# Patient Record
Sex: FEMALE | URBAN - METROPOLITAN AREA
[De-identification: names, ages, dates, MRNs, and addresses within clinical notes are randomized per-mention and may not be internally consistent; named-entity substitution may affect disease eponyms.]

---

## 2017-02-06 ENCOUNTER — APPOINTMENT (RX ONLY)
Dept: URBAN - METROPOLITAN AREA CLINIC 158 | Facility: CLINIC | Age: 72
Setting detail: DERMATOLOGY
End: 2017-02-06

## 2017-02-06 VITALS — HEART RATE: 86 BPM | SYSTOLIC BLOOD PRESSURE: 200 MMHG | DIASTOLIC BLOOD PRESSURE: 88 MMHG

## 2017-02-06 DIAGNOSIS — L82.1 OTHER SEBORRHEIC KERATOSIS: ICD-10-CM

## 2017-02-06 DIAGNOSIS — D485 NEOPLASM OF UNCERTAIN BEHAVIOR OF SKIN: ICD-10-CM

## 2017-02-06 DIAGNOSIS — D22 MELANOCYTIC NEVI: ICD-10-CM

## 2017-02-06 PROBLEM — D22.72 MELANOCYTIC NEVI OF LEFT LOWER LIMB, INCLUDING HIP: Status: ACTIVE | Noted: 2017-02-06

## 2017-02-06 PROBLEM — I10 ESSENTIAL (PRIMARY) HYPERTENSION: Status: ACTIVE | Noted: 2017-02-06

## 2017-02-06 PROBLEM — M12.9 ARTHROPATHY, UNSPECIFIED: Status: ACTIVE | Noted: 2017-02-06

## 2017-02-06 PROBLEM — D22.39 MELANOCYTIC NEVI OF OTHER PARTS OF FACE: Status: ACTIVE | Noted: 2017-02-06

## 2017-02-06 PROBLEM — L85.3 XEROSIS CUTIS: Status: ACTIVE | Noted: 2017-02-06

## 2017-02-06 PROBLEM — D48.5 NEOPLASM OF UNCERTAIN BEHAVIOR OF SKIN: Status: ACTIVE | Noted: 2017-02-06

## 2017-02-06 PROBLEM — D03.72 MELANOMA IN SITU OF LEFT LOWER LIMB, INCLUDING HIP: Status: ACTIVE | Noted: 2017-02-06

## 2017-02-06 PROBLEM — E13.9 OTHER SPECIFIED DIABETES MELLITUS WITHOUT COMPLICATIONS: Status: ACTIVE | Noted: 2017-02-06

## 2017-02-06 PROCEDURE — ? BIOPSY BY SHAVE METHOD

## 2017-02-06 PROCEDURE — ? COUNSELING

## 2017-02-06 PROCEDURE — 99213 OFFICE O/P EST LOW 20 MIN: CPT | Mod: 25

## 2017-02-06 PROCEDURE — ? BIOPSY BY SHAVE METHOD WITH FROZEN SECTION

## 2017-02-06 PROCEDURE — 11100: CPT

## 2017-02-06 PROCEDURE — 11101: CPT

## 2017-02-06 ASSESSMENT — LOCATION SIMPLE DESCRIPTION DERM
LOCATION SIMPLE: LEFT UPPER BACK
LOCATION SIMPLE: RIGHT FOREHEAD
LOCATION SIMPLE: LEFT PRETIBIAL REGION
LOCATION SIMPLE: LEFT HEEL

## 2017-02-06 ASSESSMENT — LOCATION DETAILED DESCRIPTION DERM
LOCATION DETAILED: LEFT PROXIMAL PRETIBIAL REGION
LOCATION DETAILED: RIGHT LATERAL FOREHEAD
LOCATION DETAILED: LEFT LATERAL UPPER BACK
LOCATION DETAILED: LEFT HEEL

## 2017-02-06 ASSESSMENT — LOCATION ZONE DERM
LOCATION ZONE: FACE
LOCATION ZONE: TRUNK
LOCATION ZONE: FEET
LOCATION ZONE: LEG

## 2017-02-06 NOTE — PROCEDURE: BIOPSY BY SHAVE METHOD WITH FROZEN SECTION
Biopsy Method: 15 blade
Size Of Lesion In Cm (Optional): 3.3
Anesthesia Volume In Cc: 0.8
Bill 41016 For Specimen Handling/Conveyance To Laboratory?: no
Detail Level: Detailed
Anesthesia Type: 1% lidocaine with 1:100,000 epinephrine and 408mcg clindamycin/ml and a 1:10 solution of 8.4% sodium bicarbonate
Body Location Override (Optional - Billing Will Still Be Based On Selected Body Map Location If Applicable): left plantar heel
Wound Care: Bacitracin
Billing Type: Third-Party Bill
X Size Of Lesion In Cm (Optional): 0
Number Of Blocks: 1
Consent: Written consent was obtained and risks were reviewed including but not limited to scarring, infection, bleeding, scabbing, incomplete removal, nerve damage and allergy to anesthesia.
Frozen Path Diagnosis: MIS
Post-Care Instructions: I reviewed with the patient in detail post-care instructions. Patient is to keep the biopsy site dry overnight, and then apply bacitracin twice daily until healed. Patient may apply hydrogen peroxide soaks to remove any crusting.
Biopsy Type: Frozen Section
Processing Note: The specimen was frozen in the cryostat, sectioned and stained.
Hemostasis: Drysol
Notification Instructions: Patient will be notified of biopsy results. However, patient instructed to call the office if not contacted within 2 weeks.

## 2017-02-06 NOTE — PROCEDURE: BIOPSY BY SHAVE METHOD
Hemostasis: Drysol
Type Of Destruction Used: Curettage
Curettage Text: The wound bed was treated with curettage after the biopsy was performed.
Bill For Surgical Tray: no
Dressing: bandage
Anesthesia Type: 1% lidocaine with epinephrine
Cryotherapy Text: The wound bed was treated with cryotherapy after the biopsy was performed.
X Size Of Lesion In Cm: 0
Silver Nitrate Text: The wound bed was treated with silver nitrate after the biopsy was performed.
Biopsy Method: Dermablade
Electrodesiccation Text: The wound bed was treated with electrodesiccation after the biopsy was performed.
Notification Instructions: Patient will be notified of biopsy results. However, patient instructed to call the office if not contacted within 2 weeks.
Billing Type: Third-Party Bill
Consent: Written consent was obtained and risks were reviewed including but not limited to scarring, infection, bleeding, scabbing, incomplete removal, nerve damage and allergy to anesthesia.
Body Location Override (Optional - Billing Will Still Be Based On Selected Body Map Location If Applicable): left plantar heel
Wound Care: Bacitracin
Size Of Lesion In Cm: 3.3
Biopsy Type: H and E
Post-Care Instructions: I reviewed with the patient in detail post-care instructions. Patient is to keep the biopsy site dry overnight, and then apply bacitracin twice daily until healed. Patient may apply hydrogen peroxide soaks to remove any crusting.
Electrodesiccation And Curettage Text: The wound bed was treated with electrodesiccation and curettage after the biopsy was performed.
Anesthesia Volume In Cc: 0.5
Detail Level: Detailed

## 2017-03-06 ENCOUNTER — APPOINTMENT (RX ONLY)
Dept: URBAN - METROPOLITAN AREA CLINIC 158 | Facility: CLINIC | Age: 72
Setting detail: DERMATOLOGY
End: 2017-03-06

## 2017-03-06 VITALS — SYSTOLIC BLOOD PRESSURE: 131 MMHG | DIASTOLIC BLOOD PRESSURE: 78 MMHG | HEART RATE: 87 BPM

## 2017-03-06 DIAGNOSIS — D485 NEOPLASM OF UNCERTAIN BEHAVIOR OF SKIN: ICD-10-CM

## 2017-03-06 PROBLEM — D48.5 NEOPLASM OF UNCERTAIN BEHAVIOR OF SKIN: Status: ACTIVE | Noted: 2017-03-06

## 2017-03-06 PROCEDURE — 11100: CPT

## 2017-03-06 PROCEDURE — ? BIOPSY BY SHAVE METHOD

## 2017-03-06 ASSESSMENT — LOCATION SIMPLE DESCRIPTION DERM: LOCATION SIMPLE: LEFT PLANTAR SURFACE

## 2017-03-06 ASSESSMENT — LOCATION DETAILED DESCRIPTION DERM: LOCATION DETAILED: LEFT MEDIAL PLANTAR HEEL

## 2017-03-06 ASSESSMENT — LOCATION ZONE DERM: LOCATION ZONE: FEET

## 2017-03-06 NOTE — PROCEDURE: BIOPSY BY SHAVE METHOD
Detail Level: Detailed
Body Location Override (Optional - Billing Will Still Be Based On Selected Body Map Location If Applicable): Left plantar heel
Destruction After The Procedure: No
Cryotherapy Text: The wound bed was treated with cryotherapy after the biopsy was performed.
Anesthesia Volume In Cc: 0.5
Dressing: bandage
Additional Anesthesia Volume In Cc (Will Not Render If 0): 0
Billing Type: Third-Party Bill
Type Of Destruction Used: Curettage
Curettage Text: The wound bed was treated with curettage after the biopsy was performed.
Size Of Lesion In Cm: 4.2
Path Notes (To The Dermatopathologist): ATTN DR GONZALEZ. Re-biopsies pigmented lesion. Melanoma?
Anesthesia Type: 1% lidocaine with epinephrine
Electrodesiccation Text: The wound bed was treated with electrodesiccation after the biopsy was performed.
Biopsy Type: H and E
Hemostasis: Drysol
Silver Nitrate Text: The wound bed was treated with silver nitrate after the biopsy was performed.
Post-Care Instructions: I reviewed with the patient in detail post-care instructions. Patient is to keep the biopsy site dry overnight, and then apply bacitracin twice daily until healed. Patient may apply hydrogen peroxide soaks to remove any crusting.
Wound Care: Bacitracin
Electrodesiccation And Curettage Text: The wound bed was treated with electrodesiccation and curettage after the biopsy was performed.
Notification Instructions: Patient will be notified of biopsy results. However, patient instructed to call the office if not contacted within 2 weeks.
Biopsy Method: Dermablade
Consent: Written consent was obtained and risks were reviewed including but not limited to scarring, infection, bleeding, scabbing, incomplete removal, nerve damage and allergy to anesthesia.

## 2017-03-29 ENCOUNTER — APPOINTMENT (RX ONLY)
Dept: RURAL CLINIC 3 | Facility: CLINIC | Age: 72
Setting detail: DERMATOLOGY
End: 2017-03-29

## 2017-03-29 DIAGNOSIS — L81.4 OTHER MELANIN HYPERPIGMENTATION: ICD-10-CM

## 2017-03-29 DIAGNOSIS — L81.0 POSTINFLAMMATORY HYPERPIGMENTATION: ICD-10-CM

## 2017-03-29 DIAGNOSIS — Z86.007 PERSONAL HISTORY OF IN-SITU NEOPLASM OF SKIN: ICD-10-CM

## 2017-03-29 PROBLEM — Z85.828 PERSONAL HISTORY OF OTHER MALIGNANT NEOPLASM OF SKIN: Status: ACTIVE | Noted: 2017-03-29

## 2017-03-29 PROBLEM — E13.9 OTHER SPECIFIED DIABETES MELLITUS WITHOUT COMPLICATIONS: Status: ACTIVE | Noted: 2017-03-29

## 2017-03-29 PROBLEM — L85.3 XEROSIS CUTIS: Status: ACTIVE | Noted: 2017-03-29

## 2017-03-29 PROCEDURE — ? TREATMENT REGIMEN

## 2017-03-29 PROCEDURE — ? PRESCRIPTION

## 2017-03-29 PROCEDURE — ? COUNSELING

## 2017-03-29 PROCEDURE — 99213 OFFICE O/P EST LOW 20 MIN: CPT

## 2017-03-29 RX ORDER — HYDROQUINONE 40 MG/G
CREAM TOPICAL
Qty: 28 | Refills: 5 | Status: ERX | COMMUNITY
Start: 2017-03-29

## 2017-03-29 RX ADMIN — HYDROQUINONE: 40 CREAM TOPICAL at 19:25

## 2017-03-29 ASSESSMENT — LOCATION ZONE DERM
LOCATION ZONE: FEET
LOCATION ZONE: LEG

## 2017-03-29 ASSESSMENT — LOCATION SIMPLE DESCRIPTION DERM
LOCATION SIMPLE: LEFT HEEL
LOCATION SIMPLE: RIGHT CALF

## 2017-03-29 ASSESSMENT — LOCATION DETAILED DESCRIPTION DERM
LOCATION DETAILED: LEFT HEEL
LOCATION DETAILED: RIGHT DISTAL CALF

## 2017-03-29 NOTE — PROCEDURE: TREATMENT REGIMEN
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
Plan: Dr. Wylie recently biopsied lesion which came back as lentigo on the heel.  MM was suspected.  Talked to Dr. Wylie after biopsy and he is planning to follow the lesion regularly.  Biopsy site is healing well today.  Pt is scheduled to see Dr. Wylie again in June
Initiate Treatment: Hydroquinone 4%
Detail Level: Zone

## 2017-06-12 ENCOUNTER — APPOINTMENT (RX ONLY)
Dept: URBAN - METROPOLITAN AREA CLINIC 158 | Facility: CLINIC | Age: 72
Setting detail: DERMATOLOGY
End: 2017-06-12

## 2017-06-12 VITALS — DIASTOLIC BLOOD PRESSURE: 77 MMHG | HEART RATE: 95 BPM | SYSTOLIC BLOOD PRESSURE: 147 MMHG

## 2017-06-12 DIAGNOSIS — D485 NEOPLASM OF UNCERTAIN BEHAVIOR OF SKIN: ICD-10-CM

## 2017-06-12 DIAGNOSIS — D22 MELANOCYTIC NEVI: ICD-10-CM

## 2017-06-12 DIAGNOSIS — L82.1 OTHER SEBORRHEIC KERATOSIS: ICD-10-CM

## 2017-06-12 PROBLEM — D48.5 NEOPLASM OF UNCERTAIN BEHAVIOR OF SKIN: Status: ACTIVE | Noted: 2017-06-12

## 2017-06-12 PROBLEM — I10 ESSENTIAL (PRIMARY) HYPERTENSION: Status: ACTIVE | Noted: 2017-06-12

## 2017-06-12 PROBLEM — M12.9 ARTHROPATHY, UNSPECIFIED: Status: ACTIVE | Noted: 2017-06-12

## 2017-06-12 PROBLEM — D22.62 MELANOCYTIC NEVI OF LEFT UPPER LIMB, INCLUDING SHOULDER: Status: ACTIVE | Noted: 2017-06-12

## 2017-06-12 PROBLEM — D22.39 MELANOCYTIC NEVI OF OTHER PARTS OF FACE: Status: ACTIVE | Noted: 2017-06-12

## 2017-06-12 PROCEDURE — ? OBSERVATION

## 2017-06-12 PROCEDURE — 99213 OFFICE O/P EST LOW 20 MIN: CPT

## 2017-06-12 PROCEDURE — ? COUNSELING

## 2017-06-12 ASSESSMENT — LOCATION ZONE DERM
LOCATION ZONE: FEET
LOCATION ZONE: LEG
LOCATION ZONE: ARM
LOCATION ZONE: FACE

## 2017-06-12 ASSESSMENT — LOCATION DETAILED DESCRIPTION DERM
LOCATION DETAILED: LEFT DISTAL PRETIBIAL REGION
LOCATION DETAILED: RIGHT FOREHEAD
LOCATION DETAILED: LEFT INFERIOR CENTRAL MALAR CHEEK
LOCATION DETAILED: LEFT PROXIMAL DORSAL FOREARM
LOCATION DETAILED: LEFT PROXIMAL POSTERIOR UPPER ARM
LOCATION DETAILED: LEFT HEEL

## 2017-06-12 ASSESSMENT — LOCATION SIMPLE DESCRIPTION DERM
LOCATION SIMPLE: LEFT FOREARM
LOCATION SIMPLE: LEFT POSTERIOR UPPER ARM
LOCATION SIMPLE: LEFT CHEEK
LOCATION SIMPLE: LEFT HEEL
LOCATION SIMPLE: RIGHT FOREHEAD
LOCATION SIMPLE: LEFT PRETIBIAL REGION

## 2017-06-12 NOTE — PROCEDURE: OBSERVATION
Size Of Lesion In Cm (Optional): 0
Body Location Override (Optional - Billing Will Still Be Based On Selected Body Map Location If Applicable): left plantar heel
Detail Level: Detailed

## 2022-08-22 ENCOUNTER — TELEPHONE (OUTPATIENT)
Dept: GASTROENTEROLOGY | Facility: HOSPITAL | Age: 77
End: 2022-08-22

## 2022-10-10 ENCOUNTER — HOSPITAL ENCOUNTER (EMERGENCY)
Facility: HOSPITAL | Age: 77
Discharge: HOME | End: 2022-10-10
Attending: FAMILY MEDICINE
Payer: MEDICARE

## 2022-10-10 VITALS
SYSTOLIC BLOOD PRESSURE: 132 MMHG | HEIGHT: 63 IN | BODY MASS INDEX: 36.7 KG/M2 | DIASTOLIC BLOOD PRESSURE: 90 MMHG | OXYGEN SATURATION: 99 % | HEART RATE: 83 BPM | TEMPERATURE: 98 F | WEIGHT: 207.13 LBS | RESPIRATION RATE: 16 BRPM

## 2022-10-10 DIAGNOSIS — R53.1 WEAKNESS: Primary | ICD-10-CM

## 2022-10-10 DIAGNOSIS — R19.7 DIARRHEA, UNSPECIFIED TYPE: ICD-10-CM

## 2022-10-10 DIAGNOSIS — R11.2 NAUSEA AND VOMITING, UNSPECIFIED VOMITING TYPE: ICD-10-CM

## 2022-10-10 DIAGNOSIS — E86.0 DEHYDRATION: ICD-10-CM

## 2022-10-10 LAB
ALBUMIN SERPL BCP-MCNC: 3.3 G/DL (ref 3.5–5)
ALBUMIN/GLOB SERPL: 0.8 {RATIO}
ALP SERPL-CCNC: 80 U/L (ref 55–142)
ALT SERPL W P-5'-P-CCNC: 22 U/L (ref 13–56)
ANION GAP SERPL CALCULATED.3IONS-SCNC: 13 MMOL/L (ref 7–16)
AST SERPL W P-5'-P-CCNC: 21 U/L (ref 15–37)
BASOPHILS # BLD AUTO: 0.01 K/UL (ref 0–0.2)
BASOPHILS NFR BLD AUTO: 0.1 % (ref 0–1)
BILIRUB SERPL-MCNC: 0.2 MG/DL (ref ?–1.2)
BUN SERPL-MCNC: 37 MG/DL (ref 7–18)
BUN/CREAT SERPL: 37 (ref 6–20)
CALCIUM SERPL-MCNC: 9.8 MG/DL (ref 8.5–10.1)
CHLORIDE SERPL-SCNC: 97 MMOL/L (ref 98–107)
CO2 SERPL-SCNC: 29 MMOL/L (ref 21–32)
CREAT SERPL-MCNC: 0.99 MG/DL (ref 0.55–1.02)
DIFFERENTIAL METHOD BLD: ABNORMAL
EGFR (NO RACE VARIABLE) (RUSH/TITUS): 59 ML/MIN/1.73M²
EOSINOPHIL # BLD AUTO: 0 K/UL (ref 0–0.5)
EOSINOPHIL NFR BLD AUTO: 0 % (ref 1–4)
ERYTHROCYTE [DISTWIDTH] IN BLOOD BY AUTOMATED COUNT: 12.7 % (ref 11.5–14.5)
GLOBULIN SER-MCNC: 4.3 G/DL (ref 2–4)
GLUCOSE SERPL-MCNC: 109 MG/DL (ref 74–106)
HCT VFR BLD AUTO: 44.5 % (ref 38–47)
HGB BLD-MCNC: 13.6 G/DL (ref 12–16)
IMM GRANULOCYTES # BLD AUTO: 0.03 K/UL (ref 0–0.04)
IMM GRANULOCYTES NFR BLD: 0.3 % (ref 0–0.4)
LYMPHOCYTES # BLD AUTO: 1.37 K/UL (ref 1–4.8)
LYMPHOCYTES NFR BLD AUTO: 12.9 % (ref 27–41)
MCH RBC QN AUTO: 28.8 PG (ref 27–31)
MCHC RBC AUTO-ENTMCNC: 30.6 G/DL (ref 32–36)
MCV RBC AUTO: 94.1 FL (ref 80–96)
MONOCYTES # BLD AUTO: 0.9 K/UL (ref 0–0.8)
MONOCYTES NFR BLD AUTO: 8.4 % (ref 2–6)
MPC BLD CALC-MCNC: 12.1 FL (ref 9.4–12.4)
NEUTROPHILS # BLD AUTO: 8.35 K/UL (ref 1.8–7.7)
NEUTROPHILS NFR BLD AUTO: 78.3 % (ref 53–65)
PLATELET # BLD AUTO: 219 K/UL (ref 150–400)
POTASSIUM SERPL-SCNC: 4.1 MMOL/L (ref 3.5–5.1)
PROT SERPL-MCNC: 7.6 G/DL (ref 6.4–8.2)
RBC # BLD AUTO: 4.73 M/UL (ref 4.2–5.4)
SODIUM SERPL-SCNC: 135 MMOL/L (ref 136–145)
WBC # BLD AUTO: 10.66 K/UL (ref 4.5–11)

## 2022-10-10 PROCEDURE — 36415 COLL VENOUS BLD VENIPUNCTURE: CPT | Performed by: FAMILY MEDICINE

## 2022-10-10 PROCEDURE — 96372 THER/PROPH/DIAG INJ SC/IM: CPT | Mod: 59

## 2022-10-10 PROCEDURE — 63600175 PHARM REV CODE 636 W HCPCS: Performed by: FAMILY MEDICINE

## 2022-10-10 PROCEDURE — 80053 COMPREHEN METABOLIC PANEL: CPT | Performed by: FAMILY MEDICINE

## 2022-10-10 PROCEDURE — 99284 EMERGENCY DEPT VISIT MOD MDM: CPT

## 2022-10-10 PROCEDURE — 96374 THER/PROPH/DIAG INJ IV PUSH: CPT

## 2022-10-10 PROCEDURE — 85025 COMPLETE CBC W/AUTO DIFF WBC: CPT | Performed by: FAMILY MEDICINE

## 2022-10-10 PROCEDURE — 99284 EMERGENCY DEPT VISIT MOD MDM: CPT | Performed by: FAMILY MEDICINE

## 2022-10-10 PROCEDURE — 25000003 PHARM REV CODE 250: Performed by: FAMILY MEDICINE

## 2022-10-10 PROCEDURE — 96361 HYDRATE IV INFUSION ADD-ON: CPT

## 2022-10-10 RX ORDER — LATANOPROST 50 UG/ML
1 SOLUTION/ DROPS OPHTHALMIC NIGHTLY
COMMUNITY
Start: 2022-08-17

## 2022-10-10 RX ORDER — DICYCLOMINE HYDROCHLORIDE 20 MG/1
20 TABLET ORAL 2 TIMES DAILY
Qty: 20 TABLET | Refills: 0 | Status: SHIPPED | OUTPATIENT
Start: 2022-10-10 | End: 2022-11-09

## 2022-10-10 RX ORDER — POTASSIUM CHLORIDE 20 MEQ/1
1 TABLET, EXTENDED RELEASE ORAL DAILY
COMMUNITY
Start: 2022-03-24

## 2022-10-10 RX ORDER — DICYCLOMINE HYDROCHLORIDE 10 MG/ML
20 INJECTION INTRAMUSCULAR
Status: COMPLETED | OUTPATIENT
Start: 2022-10-10 | End: 2022-10-10

## 2022-10-10 RX ORDER — SIMVASTATIN 80 MG/1
1 TABLET, FILM COATED ORAL NIGHTLY
COMMUNITY
Start: 2022-03-24

## 2022-10-10 RX ORDER — LOSARTAN POTASSIUM 25 MG/1
1 TABLET ORAL DAILY
COMMUNITY
Start: 2022-05-24

## 2022-10-10 RX ORDER — FUROSEMIDE 40 MG/1
40 TABLET ORAL DAILY
COMMUNITY
Start: 2022-08-10

## 2022-10-10 RX ORDER — ONDANSETRON 4 MG/1
4 TABLET, ORALLY DISINTEGRATING ORAL EVERY 6 HOURS PRN
Qty: 12 TABLET | Refills: 0 | Status: SHIPPED | OUTPATIENT
Start: 2022-10-10 | End: 2023-06-01

## 2022-10-10 RX ORDER — LORATADINE 10 MG/1
1 TABLET ORAL DAILY
COMMUNITY
Start: 2022-03-24

## 2022-10-10 RX ORDER — ONDANSETRON 2 MG/ML
4 INJECTION INTRAMUSCULAR; INTRAVENOUS
Status: COMPLETED | OUTPATIENT
Start: 2022-10-10 | End: 2022-10-10

## 2022-10-10 RX ADMIN — ONDANSETRON 4 MG: 2 INJECTION INTRAMUSCULAR; INTRAVENOUS at 01:10

## 2022-10-10 RX ADMIN — DICYCLOMINE HYDROCHLORIDE 20 MG: 20 INJECTION, SOLUTION INTRAMUSCULAR at 01:10

## 2022-10-10 RX ADMIN — SODIUM CHLORIDE 500 ML: 0.9 INJECTION, SOLUTION INTRAVENOUS at 01:10

## 2022-10-10 NOTE — ED TRIAGE NOTES
Pt c/o generalized weakness after having vomiting and diarrhea last week. States that she is still not eating/drinking good. Last vomiting was Saturday. Last stool was Thursday. States that she still has some lingering nausea.

## 2022-10-10 NOTE — ED PROVIDER NOTES
Encounter Date: 10/10/2022       History     Chief Complaint   Patient presents with    Weakness     generalized    Nausea     Pt c/o generalized weakness after having vomiting and diarrhea last week. States that she is still not eating/drinking well. Vomiting started 5 days ago, last vomiting was two days ago. Last loose stool was 4 days ago. States that she still has some lingering nausea and crampy abdominal pain.  No BRBPR/Melena/Hematemesis.  No F/C.      Review of patient's allergies indicates:   Allergen Reactions    Penicillins      Past Medical History:   Diagnosis Date    Hypertension     Mixed hyperlipidemia      Past Surgical History:   Procedure Laterality Date    skin cancer removal Right     foot     History reviewed. No pertinent family history.  Social History     Tobacco Use    Smoking status: Never    Smokeless tobacco: Never   Substance Use Topics    Alcohol use: Never    Drug use: Never     Review of Systems   Constitutional:  Positive for fatigue (generalized weakness).   Gastrointestinal:  Positive for abdominal pain (crampy), diarrhea, nausea and vomiting.   All other systems reviewed and are negative.    Physical Exam     Initial Vitals [10/10/22 1129]   BP Pulse Resp Temp SpO2   (!) 139/90 109 18 97.7 °F (36.5 °C) 99 %      MAP       --         Physical Exam    Nursing note and vitals reviewed.  Constitutional: She appears well-developed and well-nourished.   HENT:   Head: Normocephalic and atraumatic.   Mouth/Throat: Mucous membranes are dry.   Neck: Neck supple. No tracheal deviation present. No JVD present.   Cardiovascular:  Normal rate, regular rhythm, normal heart sounds and intact distal pulses.     Exam reveals no gallop and no friction rub.       No murmur heard.  Pulmonary/Chest: Breath sounds normal. No stridor. No respiratory distress. She has no wheezes. She has no rhonchi. She has no rales.   Abdominal: Abdomen is soft. Bowel sounds are normal. She exhibits no distension.  There is no abdominal tenderness. There is no rebound and no guarding.   Musculoskeletal:         General: No tenderness or edema. Normal range of motion.      Cervical back: Neck supple.     Neurological: She is alert and oriented to person, place, and time.   Skin: Skin is warm and dry. Capillary refill takes less than 2 seconds.   Psychiatric: She has a normal mood and affect.       Medical Screening Exam   See Full Note    ED Course   Procedures  Labs Reviewed   COMPREHENSIVE METABOLIC PANEL - Abnormal; Notable for the following components:       Result Value    Sodium 135 (*)     Chloride 97 (*)     Glucose 109 (*)     BUN 37 (*)     BUN/Creatinine Ratio 37 (*)     Albumin 3.3 (*)     Globulin 4.3 (*)     eGFR 59 (*)     All other components within normal limits   CBC WITH DIFFERENTIAL - Abnormal; Notable for the following components:    MCHC 30.6 (*)     Neutrophils % 78.3 (*)     Lymphocytes % 12.9 (*)     Neutrophils, Abs 8.35 (*)     Monocytes % 8.4 (*)     Eosinophils % 0.0 (*)     Monocytes, Absolute 0.90 (*)     All other components within normal limits   CBC W/ AUTO DIFFERENTIAL    Narrative:     The following orders were created for panel order CBC auto differential.  Procedure                               Abnormality         Status                     ---------                               -----------         ------                     CBC with Differential[[id number]]        Abnormal            Final result                 Please view results for these tests on the individual orders.          Imaging Results    None          Medications   sodium chloride 0.9% bolus 500 mL (0 mLs Intravenous Stopped 10/10/22 1348)   ondansetron injection 4 mg (4 mg Intravenous Given 10/10/22 1309)   dicyclomine injection 20 mg (20 mg Intramuscular Given 10/10/22 1308)                       Clinical Impression:   Final diagnoses:  [R53.1] Weakness (Primary)  [R19.7] Diarrhea, unspecified type  [R11.2] Nausea and  vomiting, unspecified vomiting type  [E86.0] Dehydration             Ezio Agustin MD  10/10/22 1715

## 2022-11-08 DIAGNOSIS — Z12.11 COLON CANCER SCREENING: Primary | ICD-10-CM

## 2023-02-17 ENCOUNTER — HOSPITAL ENCOUNTER (OUTPATIENT)
Dept: GASTROENTEROLOGY | Facility: HOSPITAL | Age: 78
Discharge: HOME | End: 2023-02-17
Attending: STUDENT IN AN ORGANIZED HEALTH CARE EDUCATION/TRAINING PROGRAM
Payer: MEDICARE

## 2023-02-17 ENCOUNTER — ANESTHESIA EVENT (OUTPATIENT)
Dept: GASTROENTEROLOGY | Facility: HOSPITAL | Age: 78
End: 2023-02-17
Payer: MEDICARE

## 2023-02-17 ENCOUNTER — ANESTHESIA (OUTPATIENT)
Dept: GASTROENTEROLOGY | Facility: HOSPITAL | Age: 78
End: 2023-02-17
Payer: MEDICARE

## 2023-02-17 VITALS
RESPIRATION RATE: 14 BRPM | SYSTOLIC BLOOD PRESSURE: 98 MMHG | HEART RATE: 74 BPM | OXYGEN SATURATION: 100 % | DIASTOLIC BLOOD PRESSURE: 59 MMHG

## 2023-02-17 DIAGNOSIS — Z12.11 COLON CANCER SCREENING: ICD-10-CM

## 2023-02-17 PROCEDURE — D9220A PRA ANESTHESIA: ICD-10-PCS | Mod: PT,,, | Performed by: NURSE ANESTHETIST, CERTIFIED REGISTERED

## 2023-02-17 PROCEDURE — 88305 TISSUE EXAM BY PATHOLOGIST: CPT | Mod: 26,,, | Performed by: PATHOLOGY

## 2023-02-17 PROCEDURE — 63600175 PHARM REV CODE 636 W HCPCS: Performed by: NURSE ANESTHETIST, CERTIFIED REGISTERED

## 2023-02-17 PROCEDURE — 88305 SURGICAL PATHOLOGY: ICD-10-PCS | Mod: 26,,, | Performed by: PATHOLOGY

## 2023-02-17 PROCEDURE — 45380 COLONOSCOPY AND BIOPSY: CPT | Mod: PT,59,, | Performed by: INTERNAL MEDICINE

## 2023-02-17 PROCEDURE — 45380 COLONOSCOPY AND BIOPSY: CPT | Mod: 59,PT | Performed by: INTERNAL MEDICINE

## 2023-02-17 PROCEDURE — D9220A PRA ANESTHESIA: Mod: PT,,, | Performed by: NURSE ANESTHETIST, CERTIFIED REGISTERED

## 2023-02-17 PROCEDURE — 25000003 PHARM REV CODE 250: Performed by: NURSE ANESTHETIST, CERTIFIED REGISTERED

## 2023-02-17 PROCEDURE — 37000009 HC ANESTHESIA EA ADD 15 MINS

## 2023-02-17 PROCEDURE — 45380 PR COLONOSCOPY,BIOPSY: ICD-10-PCS | Mod: PT,59,, | Performed by: INTERNAL MEDICINE

## 2023-02-17 PROCEDURE — 37000008 HC ANESTHESIA 1ST 15 MINUTES

## 2023-02-17 PROCEDURE — 45385 COLONOSCOPY W/LESION REMOVAL: CPT | Mod: PT | Performed by: INTERNAL MEDICINE

## 2023-02-17 PROCEDURE — 27201423 OPTIME MED/SURG SUP & DEVICES STERILE SUPPLY

## 2023-02-17 PROCEDURE — 88305 TISSUE EXAM BY PATHOLOGIST: CPT | Mod: TC,SUR | Performed by: INTERNAL MEDICINE

## 2023-02-17 PROCEDURE — 45385 COLONOSCOPY W/LESION REMOVAL: CPT | Mod: PT,,, | Performed by: INTERNAL MEDICINE

## 2023-02-17 PROCEDURE — 45385 PR COLONOSCOPY,REMV LESN,SNARE: ICD-10-PCS | Mod: PT,,, | Performed by: INTERNAL MEDICINE

## 2023-02-17 RX ORDER — PROPOFOL 10 MG/ML
INJECTION, EMULSION INTRAVENOUS
Status: DISCONTINUED | OUTPATIENT
Start: 2023-02-17 | End: 2023-02-17

## 2023-02-17 RX ADMIN — PROPOFOL 30 MG: 10 INJECTION, EMULSION INTRAVENOUS at 09:02

## 2023-02-17 RX ADMIN — PROPOFOL 20 MG: 10 INJECTION, EMULSION INTRAVENOUS at 09:02

## 2023-02-17 RX ADMIN — PROPOFOL 50 MG: 10 INJECTION, EMULSION INTRAVENOUS at 09:02

## 2023-02-17 RX ADMIN — SODIUM CHLORIDE: 9 INJECTION, SOLUTION INTRAVENOUS at 09:02

## 2023-02-17 RX ADMIN — PROPOFOL 20 MG: 10 INJECTION, EMULSION INTRAVENOUS at 10:02

## 2023-02-17 RX ADMIN — SODIUM CHLORIDE: 9 INJECTION, SOLUTION INTRAVENOUS at 10:02

## 2023-02-17 RX ADMIN — PROPOFOL 40 MG: 10 INJECTION, EMULSION INTRAVENOUS at 09:02

## 2023-02-17 NOTE — TRANSFER OF CARE
Anesthesia Transfer of Care Note    Patient: Voncille Amauri    Procedure(s) Performed: * No procedures listed *    Patient location: PACU    Anesthesia Type: general    Transport from OR: Transported from OR on room air with adequate spontaneous ventilation    Post pain: adequate analgesia    Post assessment: no apparent anesthetic complications and tolerated procedure well    Post vital signs: stable    Level of consciousness: alert and awake    Nausea/Vomiting: no nausea/vomiting    Complications: none    Transfer of care protocol was followed      Last vitals:   Visit Vitals  BP (!) 94/59   Pulse 83   Resp (!) 22   SpO2 100%   Breastfeeding No

## 2023-02-17 NOTE — ANESTHESIA POSTPROCEDURE EVALUATION
Anesthesia Post Evaluation    Patient: Tom Baugh    Procedure(s) Performed: * No procedures listed *    Final Anesthesia Type: general      Patient location during evaluation: PACU  Patient participation: Yes- Able to Participate  Level of consciousness: awake and alert and oriented  Post-procedure vital signs: reviewed and stable  Pain management: adequate  Airway patency: patent    PONV status at discharge: No PONV  Anesthetic complications: no      Cardiovascular status: blood pressure returned to baseline and hemodynamically stable  Respiratory status: unassisted  Hydration status: euvolemic  Follow-up not needed.          Vitals Value Taken Time   /57 02/17/23 1016   Temp  02/17/23 1016   Pulse 66 02/17/23 1016   Resp 15 02/17/23 1016   SpO2 98 % 02/17/23 1016   Vitals shown include unvalidated device data.      No case tracking events are documented in the log.      Pain/Steph Score: Steph Score: 10 (2/17/2023 10:12 AM)

## 2023-02-17 NOTE — H&P
Gastroenterology Pre-procedure H&P    Chief Complaint: Colon cancer screening    History of Present Illness    Tom Baugh is a 77 y.o. female that  has a past medical history of Hypertension and Mixed hyperlipidemia.     Patient here for routine index screening     Patient denies wt loss, abdominal pain, diarrhea, melena/hematochezia, change in stool caliber, no anticoagulants, FMHx of GI related malignancies.      Past Medical History:   Diagnosis Date    Hypertension     Mixed hyperlipidemia        Past Surgical History:   Procedure Laterality Date    skin cancer removal Right     foot       History reviewed. No pertinent family history.    Social History     Socioeconomic History    Marital status:    Tobacco Use    Smoking status: Never    Smokeless tobacco: Never   Substance and Sexual Activity    Alcohol use: Never    Drug use: Never       Current Outpatient Medications   Medication Sig Dispense Refill    furosemide (LASIX) 40 MG tablet Take 40 mg by mouth once daily.      latanoprost 0.005 % ophthalmic solution Place 1 drop into both eyes every evening.      loratadine (CLARITIN) 10 mg tablet Take 1 tablet by mouth once daily.      losartan (COZAAR) 25 MG tablet Take 1 tablet by mouth once daily.      ondansetron (ZOFRAN-ODT) 4 MG TbDL Take 1 tablet (4 mg total) by mouth every 6 (six) hours as needed (nausea/vomiting). 12 tablet 0    potassium chloride SA (K-DUR,KLOR-CON) 20 MEQ tablet Take 1 tablet by mouth once daily.      simvastatin (ZOCOR) 80 MG tablet Take 1 tablet by mouth every evening.       No current facility-administered medications for this encounter.       Review of patient's allergies indicates:   Allergen Reactions    Penicillins        Objective:  Vitals:    02/17/23 0920   BP: (!) 153/68   Pulse: 94   Resp: 19   SpO2: 99%        GEN: normal appearing, NAD, AAO x3  HENT: NCAT, anicteric, OP benign  CV: normal rate, regular rhythm  RESP: NABS, symmetric rise, unlabored  ABD: soft,  ND, no guarding or TTP  SKIN: warm and dry  NEURO: grossly afocal    Assessment and Plan:    Proceed with:    Colonoscopy for screening for colon cancer    Quinn Montesinos MD  Gastroenterology

## 2023-02-17 NOTE — DISCHARGE INSTRUCTIONS
Procedure Date  2/17/23     Impression  Overall Impression:   - 7 polyps removed with a combination of forceps & cold snare polypectomy  - Moderate diverticulosis  - Grade II internal hemorrhoids  - The exam was otherwise normal        Recommendation    Await pathology results     Repeat colonoscopy in 3-5 years pending pathology (3 years if all are adenomatous) if you are otherwise healthy and wish to continue with colonoscopy for colon cancer screening      Outcome of procedure: successful Colonoscopy  Disposition: patient to recovery following procedure; discharge to home when appropriate parameters met  Provisions for follow up: please call my office for any unexpected symptoms like chest or abdominal pain or bleeding following your procedure.  Final Diagnosis: colon polyps    THE NURSE WILL CALL YOU WITH YOUR BIOPSY RESULTS IN A FEW DAYS.     NO DRIVING, OPERATING EQUIPMENT, OR SIGNING LEGAL DOCUMENTS FOR 24 HOURS.

## 2023-02-17 NOTE — ANESTHESIA PREPROCEDURE EVALUATION
02/17/2023  Tom Baugh is a 77 y.o., female.      Pre-op Assessment    I have reviewed the Patient Summary Reports.     I have reviewed the Nursing Notes. I have reviewed the NPO Status.   I have reviewed the Medications.     Review of Systems  Anesthesia Hx:  No problems with previous Anesthesia    Cardiovascular:   Hypertension hyperlipidemia    Endocrine:  Obesity / BMI > 30      Physical Exam  General: Alert and Oriented    Airway:  Mallampati: II   Mouth Opening: Normal  TM Distance: Normal  Tongue: Normal  Neck ROM: Normal ROM    Dental:  Edentulous        Anesthesia Plan  Type of Anesthesia, risks & benefits discussed:    Anesthesia Type: Gen Natural Airway  Intra-op Monitoring Plan: Standard ASA Monitors  Post Op Pain Control Plan: multimodal analgesia  Induction:  IV  Informed Consent: Informed consent signed with the Patient and all parties understand the risks and agree with anesthesia plan.  All questions answered. Patient consented to blood products? Yes  ASA Score: 3  Day of Surgery Review of History & Physical: H&P Update referred to the surgeon/provider.    Ready For Surgery From Anesthesia Perspective.     .

## 2023-02-20 LAB
ESTROGEN SERPL-MCNC: NORMAL PG/ML
INSULIN SERPL-ACNC: NORMAL U[IU]/ML
LAB AP GROSS DESCRIPTION: NORMAL
LAB AP LABORATORY NOTES: NORMAL
T3RU NFR SERPL: NORMAL %

## 2023-05-03 ENCOUNTER — HOSPITAL ENCOUNTER (EMERGENCY)
Facility: HOSPITAL | Age: 78
Discharge: HOME | End: 2023-05-03
Attending: EMERGENCY MEDICINE
Payer: MEDICARE

## 2023-05-03 VITALS
HEIGHT: 64 IN | RESPIRATION RATE: 19 BRPM | OXYGEN SATURATION: 99 % | SYSTOLIC BLOOD PRESSURE: 127 MMHG | DIASTOLIC BLOOD PRESSURE: 58 MMHG | WEIGHT: 186.69 LBS | HEART RATE: 84 BPM | TEMPERATURE: 98 F | BODY MASS INDEX: 31.87 KG/M2

## 2023-05-03 DIAGNOSIS — K52.9 ENTERITIS: ICD-10-CM

## 2023-05-03 DIAGNOSIS — E83.42 HYPOMAGNESEMIA: ICD-10-CM

## 2023-05-03 DIAGNOSIS — R05.9 COUGH: ICD-10-CM

## 2023-05-03 DIAGNOSIS — A08.4 VIRAL GASTROENTERITIS: Primary | ICD-10-CM

## 2023-05-03 LAB
ALBUMIN SERPL BCP-MCNC: 3.6 G/DL (ref 3.5–5)
ALBUMIN/GLOB SERPL: 0.9 {RATIO}
ALP SERPL-CCNC: 101 U/L (ref 55–142)
ALT SERPL W P-5'-P-CCNC: 19 U/L (ref 13–56)
AMYLASE SERPL-CCNC: 35 U/L (ref 25–115)
ANION GAP SERPL CALCULATED.3IONS-SCNC: 14 MMOL/L (ref 7–16)
AST SERPL W P-5'-P-CCNC: 15 U/L (ref 15–37)
BACTERIA #/AREA URNS HPF: ABNORMAL /HPF
BASOPHILS # BLD AUTO: 0.02 K/UL (ref 0–0.2)
BASOPHILS NFR BLD AUTO: 0.2 % (ref 0–1)
BILIRUB SERPL-MCNC: 0.3 MG/DL (ref ?–1.2)
BILIRUB UR QL STRIP: ABNORMAL
BUN SERPL-MCNC: 45 MG/DL (ref 7–18)
BUN/CREAT SERPL: 24 (ref 6–20)
CALCIUM SERPL-MCNC: 9.7 MG/DL (ref 8.5–10.1)
CHLORIDE SERPL-SCNC: 98 MMOL/L (ref 98–107)
CLARITY UR: ABNORMAL
CO2 SERPL-SCNC: 26 MMOL/L (ref 21–32)
COLOR UR: ABNORMAL
CREAT SERPL-MCNC: 1.84 MG/DL (ref 0.55–1.02)
DIFFERENTIAL METHOD BLD: ABNORMAL
EGFR (NO RACE VARIABLE) (RUSH/TITUS): 28 ML/MIN/1.73M2
EOSINOPHIL # BLD AUTO: 0.01 K/UL (ref 0–0.5)
EOSINOPHIL NFR BLD AUTO: 0.1 % (ref 1–4)
ERYTHROCYTE [DISTWIDTH] IN BLOOD BY AUTOMATED COUNT: 13.7 % (ref 11.5–14.5)
FLUAV AG UPPER RESP QL IA.RAPID: NEGATIVE
FLUBV AG UPPER RESP QL IA.RAPID: NEGATIVE
GLOBULIN SER-MCNC: 3.9 G/DL (ref 2–4)
GLUCOSE SERPL-MCNC: 117 MG/DL (ref 74–106)
GLUCOSE UR STRIP-MCNC: NEGATIVE MG/DL
HCT VFR BLD AUTO: 43.1 % (ref 38–47)
HGB BLD-MCNC: 13.7 G/DL (ref 12–16)
IMM GRANULOCYTES # BLD AUTO: 0.02 K/UL (ref 0–0.04)
IMM GRANULOCYTES NFR BLD: 0.2 % (ref 0–0.4)
KETONES UR STRIP-SCNC: NEGATIVE MG/DL
LEUKOCYTE ESTERASE UR QL STRIP: ABNORMAL
LIPASE SERPL-CCNC: 43 U/L (ref 73–393)
LYMPHOCYTES # BLD AUTO: 1.19 K/UL (ref 1–4.8)
LYMPHOCYTES NFR BLD AUTO: 11.9 % (ref 27–41)
MAGNESIUM SERPL-MCNC: 1.2 MG/DL (ref 1.7–2.3)
MCH RBC QN AUTO: 28.4 PG (ref 27–31)
MCHC RBC AUTO-ENTMCNC: 31.8 G/DL (ref 32–36)
MCV RBC AUTO: 89.2 FL (ref 80–96)
MONOCYTES # BLD AUTO: 0.57 K/UL (ref 0–0.8)
MONOCYTES NFR BLD AUTO: 5.7 % (ref 2–6)
MPC BLD CALC-MCNC: 11.5 FL (ref 9.4–12.4)
NEUTROPHILS # BLD AUTO: 8.22 K/UL (ref 1.8–7.7)
NEUTROPHILS NFR BLD AUTO: 81.9 % (ref 53–65)
NITRITE UR QL STRIP: NEGATIVE
PH UR STRIP: 5.5 PH UNITS
PLATELET # BLD AUTO: 295 K/UL (ref 150–400)
POTASSIUM SERPL-SCNC: 4 MMOL/L (ref 3.5–5.1)
PROT SERPL-MCNC: 7.5 G/DL (ref 6.4–8.2)
PROT UR QL STRIP: 30
RBC # BLD AUTO: 4.83 M/UL (ref 4.2–5.4)
RBC # UR STRIP: ABNORMAL /UL
RBC #/AREA URNS HPF: ABNORMAL /HPF
SARS-COV+SARS-COV-2 AG RESP QL IA.RAPID: NEGATIVE
SODIUM SERPL-SCNC: 134 MMOL/L (ref 136–145)
SP GR UR STRIP: >=1.03
SQUAMOUS #/AREA URNS LPF: ABNORMAL /LPF
UROBILINOGEN UR STRIP-ACNC: 1 MG/DL
WBC # BLD AUTO: 10.03 K/UL (ref 4.5–11)
WBC #/AREA URNS HPF: ABNORMAL /HPF

## 2023-05-03 PROCEDURE — 63600175 PHARM REV CODE 636 W HCPCS: Performed by: EMERGENCY MEDICINE

## 2023-05-03 PROCEDURE — 87077 CULTURE AEROBIC IDENTIFY: CPT | Performed by: EMERGENCY MEDICINE

## 2023-05-03 PROCEDURE — 96361 HYDRATE IV INFUSION ADD-ON: CPT

## 2023-05-03 PROCEDURE — 99285 EMERGENCY DEPT VISIT HI MDM: CPT | Mod: 25

## 2023-05-03 PROCEDURE — 87428 SARSCOV & INF VIR A&B AG IA: CPT | Performed by: EMERGENCY MEDICINE

## 2023-05-03 PROCEDURE — 99284 EMERGENCY DEPT VISIT MOD MDM: CPT | Performed by: EMERGENCY MEDICINE

## 2023-05-03 PROCEDURE — 96366 THER/PROPH/DIAG IV INF ADDON: CPT

## 2023-05-03 PROCEDURE — 96375 TX/PRO/DX INJ NEW DRUG ADDON: CPT

## 2023-05-03 PROCEDURE — 25000003 PHARM REV CODE 250: Performed by: EMERGENCY MEDICINE

## 2023-05-03 PROCEDURE — 82150 ASSAY OF AMYLASE: CPT | Performed by: EMERGENCY MEDICINE

## 2023-05-03 PROCEDURE — 83690 ASSAY OF LIPASE: CPT | Performed by: EMERGENCY MEDICINE

## 2023-05-03 PROCEDURE — 81001 URINALYSIS AUTO W/SCOPE: CPT | Performed by: EMERGENCY MEDICINE

## 2023-05-03 PROCEDURE — 80053 COMPREHEN METABOLIC PANEL: CPT | Performed by: EMERGENCY MEDICINE

## 2023-05-03 PROCEDURE — 83735 ASSAY OF MAGNESIUM: CPT | Performed by: EMERGENCY MEDICINE

## 2023-05-03 PROCEDURE — 85025 COMPLETE CBC W/AUTO DIFF WBC: CPT | Performed by: EMERGENCY MEDICINE

## 2023-05-03 PROCEDURE — 25500020 PHARM REV CODE 255: Performed by: EMERGENCY MEDICINE

## 2023-05-03 PROCEDURE — 87186 SC STD MICRODIL/AGAR DIL: CPT | Performed by: EMERGENCY MEDICINE

## 2023-05-03 PROCEDURE — 96365 THER/PROPH/DIAG IV INF INIT: CPT

## 2023-05-03 RX ORDER — LANOLIN ALCOHOL/MO/W.PET/CERES
400 CREAM (GRAM) TOPICAL DAILY
Qty: 30 TABLET | Refills: 0 | Status: SHIPPED | OUTPATIENT
Start: 2023-05-03 | End: 2023-06-01

## 2023-05-03 RX ORDER — ONDANSETRON 2 MG/ML
4 INJECTION INTRAMUSCULAR; INTRAVENOUS
Status: COMPLETED | OUTPATIENT
Start: 2023-05-03 | End: 2023-05-03

## 2023-05-03 RX ORDER — CIPROFLOXACIN 2 MG/ML
400 INJECTION, SOLUTION INTRAVENOUS
Status: COMPLETED | OUTPATIENT
Start: 2023-05-03 | End: 2023-05-03

## 2023-05-03 RX ORDER — MAGNESIUM SULFATE HEPTAHYDRATE 40 MG/ML
2 INJECTION, SOLUTION INTRAVENOUS
Status: COMPLETED | OUTPATIENT
Start: 2023-05-03 | End: 2023-05-03

## 2023-05-03 RX ORDER — METRONIDAZOLE 500 MG/1
500 TABLET ORAL EVERY 12 HOURS
Qty: 14 TABLET | Refills: 0 | Status: SHIPPED | OUTPATIENT
Start: 2023-05-03 | End: 2023-05-10

## 2023-05-03 RX ORDER — CIPROFLOXACIN 500 MG/1
500 TABLET ORAL 2 TIMES DAILY
Qty: 20 TABLET | Refills: 0 | Status: SHIPPED | OUTPATIENT
Start: 2023-05-03 | End: 2023-05-06 | Stop reason: ALTCHOICE

## 2023-05-03 RX ADMIN — SODIUM CHLORIDE 1000 ML: 9 INJECTION, SOLUTION INTRAVENOUS at 04:05

## 2023-05-03 RX ADMIN — MAGNESIUM SULFATE HEPTAHYDRATE 2 G: 40 INJECTION, SOLUTION INTRAVENOUS at 05:05

## 2023-05-03 RX ADMIN — DIATRIZOATE MEGLUMINE AND DIATRIZOATE SODIUM 30 ML: 660; 100 LIQUID ORAL; RECTAL at 04:05

## 2023-05-03 RX ADMIN — ONDANSETRON HYDROCHLORIDE 4 MG: 2 INJECTION, SOLUTION INTRAMUSCULAR; INTRAVENOUS at 04:05

## 2023-05-03 RX ADMIN — CIPROFLOXACIN 400 MG: 2 INJECTION, SOLUTION INTRAVENOUS at 06:05

## 2023-05-03 NOTE — DISCHARGE INSTRUCTIONS
Try using over-the-counter Pepto-Bismol for your symptoms.  Have your doctor recheck your magnesium level in 5 days.

## 2023-05-03 NOTE — ED PROVIDER NOTES
Encounter Date: 5/3/2023       History     Chief Complaint   Patient presents with    Emesis     C/o decreased appetite starting about 2 weeks ago with vomiting starting about 1 week ago     Patient presents with nausea vomiting and intermittent loose stool for the past 2 weeks.  Patient states she gets mucus in her throat and then vomits mucus.  Has had 2 loose stools over the past 2 weeks most recently 2 days ago.  Has had intermittent abdominal cramping but no abdominal pain and currently no abdominal discomfort.  No shortness of breath.  Patient states she saw her primary physician 2 days ago for same.  States she is no better.  Poor appetite for 2 weeks.  She has been able to drink fluids.    Review of patient's allergies indicates:   Allergen Reactions    Penicillins      Past Medical History:   Diagnosis Date    Hypertension     Mixed hyperlipidemia      Past Surgical History:   Procedure Laterality Date    skin cancer removal Right     foot     No family history on file.  Social History     Tobacco Use    Smoking status: Never    Smokeless tobacco: Never   Substance Use Topics    Alcohol use: Never    Drug use: Never     Review of Systems   Constitutional:  Positive for activity change (decreased activity for the past 2 weeks due to not feeling well), appetite change (poor appetite for 2 weeks) and fatigue. Negative for fever.   HENT: Negative.     Eyes: Negative.    Respiratory:  Positive for cough (minimal cough with mucus that causes her to vomit). Negative for apnea, choking, chest tightness, shortness of breath, wheezing and stridor.    Cardiovascular: Negative.  Negative for chest pain, palpitations and leg swelling.   Gastrointestinal:  Positive for abdominal pain (intermittent abdominal cramping), diarrhea, nausea and vomiting. Negative for blood in stool and constipation.   Genitourinary: Negative.    Musculoskeletal: Negative.    Skin: Negative.    Neurological: Negative.    Hematological:  Negative.    Psychiatric/Behavioral: Negative.     All other systems reviewed and are negative.    Physical Exam     Initial Vitals [05/03/23 1615]   BP Pulse Resp Temp SpO2   122/78 101 20 98 °F (36.7 °C) 100 %      MAP       --         Physical Exam    Nursing note and vitals reviewed.  Constitutional: She appears well-developed and well-nourished.   HENT:   Right Ear: External ear normal.   Left Ear: External ear normal.   Nose: Nose normal.   Mouth/Throat: Oropharynx is clear and moist. No oropharyngeal exudate.   Eyes: Conjunctivae and EOM are normal. Pupils are equal, round, and reactive to light. Right eye exhibits no discharge. Left eye exhibits no discharge.   Neck: Neck supple. No JVD present.   Normal range of motion.  Cardiovascular:  Normal rate, regular rhythm, normal heart sounds and intact distal pulses.           No murmur heard.  Pulmonary/Chest: Breath sounds normal. No stridor. No respiratory distress. She has no wheezes. She has no rhonchi. She has no rales.   Abdominal: Abdomen is soft. She exhibits distension (mild distention of the abdomen noted). There is no abdominal tenderness. There is no rebound and no guarding.   Musculoskeletal:         General: No tenderness or edema. Normal range of motion.      Cervical back: Normal range of motion and neck supple.     Lymphadenopathy:     She has no cervical adenopathy.   Neurological: She is alert and oriented to person, place, and time. She has normal strength. No cranial nerve deficit. GCS score is 15. GCS eye subscore is 4. GCS verbal subscore is 5. GCS motor subscore is 6.   Skin: Skin is warm and dry. Capillary refill takes 2 to 3 seconds. No rash noted. No erythema. No pallor.   Psychiatric: She has a normal mood and affect. Her behavior is normal.       Medical Screening Exam   See Full Note    ED Course   Procedures  Labs Reviewed   COMPREHENSIVE METABOLIC PANEL - Abnormal; Notable for the following components:       Result Value    Sodium  134 (*)     Glucose 117 (*)     BUN 45 (*)     Creatinine 1.84 (*)     BUN/Creatinine Ratio 24 (*)     eGFR 28 (*)     All other components within normal limits   LIPASE - Abnormal; Notable for the following components:    Lipase 43 (*)     All other components within normal limits   MAGNESIUM - Abnormal; Notable for the following components:    Magnesium 1.2 (*)     All other components within normal limits   URINALYSIS, REFLEX TO URINE CULTURE - Abnormal; Notable for the following components:    Leukocytes, UA Small (*)     Protein, UA 30 (*)     Bilirubin, UA Small (*)     Blood, UA Trace-Intact (*)     Specific Gravity, UA >=1.030 (*)     All other components within normal limits   CBC WITH DIFFERENTIAL - Abnormal; Notable for the following components:    MCHC 31.8 (*)     Neutrophils % 81.9 (*)     Lymphocytes % 11.9 (*)     Neutrophils, Abs 8.22 (*)     Eosinophils % 0.1 (*)     All other components within normal limits   URINALYSIS, MICROSCOPIC - Abnormal; Notable for the following components:    WBC, UA 11-15 (*)     Bacteria, UA Loaded (*)     Squamous Epithelial Cells, UA Few (*)     All other components within normal limits   AMYLASE - Normal   SARS-COV2 (COVID) W/ FLU ANTIGEN - Normal    Narrative:     Negative SARS-CoV results should not be used as the sole basis for treatment or patient management decisions; negative results should be considered in the context of a patient's recent exposures, history and the presene of clinical signs and symptoms consistent with COVID-19.  Negative results should be treated as presumptive and confirmed by molecular assay, if necessary for patient management.   CULTURE, URINE   CBC W/ AUTO DIFFERENTIAL    Narrative:     The following orders were created for panel order CBC auto differential.  Procedure                               Abnormality         Status                     ---------                               -----------         ------                     CBC  with Differential[[id number]]        Abnormal            Final result                 Please view results for these tests on the individual orders.          Imaging Results              CT Abdomen Pelvis  Without Contrast (Final result)  Result time 05/03/23 18:30:03   Procedure changed from CT Abdomen Pelvis With Contrast     Final result by Damon Gordon DO (05/03/23 18:30:03)                   Impression:      Scattered fluid within the stomach, small bowel, and large bowel may reflect an element of gastroenteritis/diarrhea causing illness.  There is some wall thickening of small bowel within the left upper quadrant suggested suspicious for enteritis with mild associated mesenteric edema.  No evidence of pneumatosis.  Colonic diverticulosis. Question subtle medullary calcinosis bilaterally. No evidence of hydronephrosis.  Other/detailed findings as above.    The CT exam was performed using one or more of the following dose    reduction techniques- Automated exposure control, adjustment of the mA    and/or kV according to patient size, and/or use of iterative    reconstructed technique.    Point of Service: [facility]      Electronically signed by: Damon Gordon  Date:    05/03/2023  Time:    18:30               Narrative:    EXAMINATION:  CT ABDOMEN PELVIS WITHOUT CONTRAST    CLINICAL HISTORY:  Bowel obstruction suspected;Abdominal pain, acute, nonlocalized;    COMPARISON:  None    TECHNIQUE:  Multiple axial tomographic images of the abdomen and pelvis were obtained without the use of intravenous contrast.    FINDINGS:  Study considered limited secondary to lack of intravenous contrast.  Mild dependent changes of the lungs noted.  Mitral annular calcifications present.    No worrisome focal hepatic abnormality demonstrated on submitted images.  Visualized gallbladder grossly unremarkable.  Visualized pancreas appears unremarkable.  Spleen grossly unremarkable.    Bilateral adrenal glands grossly  unremarkable.  Question subtle medullary calcinosis bilaterally.  No evidence of hydronephrosis.  Urinary bladder incompletely distended.  Uterine atrophic change present.    Colonic diverticulosis.  Scattered fluid within the stomach, small bowel, and large bowel may reflect an element of gastroenteritis/diarrhea causing illness.  There is some wall thickening of small bowel within the left upper quadrant suggested suspicious for enteritis with mild associated mesenteric edema.  No evidence of pneumatosis.  Atherosclerotic calcification demonstrated.  Scattered skeletal degenerative change.                                       X-Ray Chest PA And Lateral (Final result)  Result time 05/03/23 18:19:45      Final result by Damon Gordon DO (05/03/23 18:19:45)                   Impression:      No acute cardiopulmonary process demonstrated.    Point of Service: [facility]      Electronically signed by: Damon Gordon  Date:    05/03/2023  Time:    18:19               Narrative:    EXAMINATION:  XR CHEST PA AND LATERAL    CLINICAL HISTORY:  Cough, unspecified    COMPARISON:  None    TECHNIQUE:  Frontal and lateral views of the chest.    FINDINGS:  Heart size appears within normal limits.  Chronic change of the lungs without focal consolidation, pleural effusion, or pneumothorax.  Visualized osseous and surrounding soft tissue structures demonstrate no acute abnormality.                                       Medications   ciprofloxacin (CIPRO)400mg/200ml D5W IVPB 400 mg (400 mg Intravenous New Bag 5/3/23 1804)   magnesium sulfate 2g in water 50mL IVPB (premix) (2 g Intravenous New Bag 5/3/23 1756)   sodium chloride 0.9% bolus 1,000 mL 1,000 mL (0 mLs Intravenous Stopped 5/3/23 1731)   ondansetron injection 4 mg (4 mg Intravenous Given 5/3/23 1624)   diatrizoate meglumineand-diatrizoate sodium (GASTROVIEW) solution 30 mL (30 mLs Oral Given 5/3/23 1645)     Medical Decision Making:   History:   I obtained  history from: someone other than patient.       <> Summary of History: Some of the history is from the sister who reports that patient had a similar problem in October, with a severe stomach virus that subsequently resolved.  Initial Assessment:   Initial assessment is subacute nausea, vomiting, diarrhea and abdominal pain  Differential Diagnosis:   Differential diagnosis includes viral gastroenteritis, acute bronchitis, other viral illness, COVID infection, influenza infection, bowel obstruction, other intra-abdominal process.  Clinical Tests:   Lab Tests: Ordered and Reviewed  The following lab test(s) were unremarkable: CBC and Lipase       <> Summary of Lab: Amylase is normal.  Magnesium is low at 1.2.  BUN and creatinine are elevated, estimated GFR is 28, precluding use of IV contrast for CT of the abdomen.  Urinalysis is consistent with urinary infection.  Radiological Study: Ordered and Reviewed  ED Management:  Patient was given IV normal saline and Zofran for nausea.  Patient was given IV Cipro for urinary infection.  Discharge and follow-up instructions given.         Reviewed radiologist's report for CT scan of the abdomen and pelvis, indicates evidence for gastroenteritis/enteritis, no bowel obstruction noted, no other acute process seen.  Reviewed radiologist's report for PA lateral chest x-ray which indicates chronic lung changes, no acute cardiopulmonary process seen.  ED Course as of 05/03/23 1854   Wed May 03, 2023   1735 Urinalysis, Microscopic(!)  Urinalysis is consistent with urinary infection. [LM]      ED Course User Index  [LM] Dax Daniels DO                Clinical Impression:   Final diagnoses:  [R05.9] Cough  [A08.4] Viral gastroenteritis (Primary)  [K52.9] Enteritis        ED Disposition Condition    Discharge Stable          ED Prescriptions       Medication Sig Dispense Start Date End Date Auth. Provider    ciprofloxacin HCl (CIPRO) 500 MG tablet Take 1 tablet (500 mg total) by  mouth 2 (two) times daily. for 10 days 20 tablet 5/3/2023 5/13/2023 Dax Daniels DO    metroNIDAZOLE (FLAGYL) 500 MG tablet Take 1 tablet (500 mg total) by mouth every 12 (twelve) hours. for 7 days 14 tablet 5/3/2023 5/10/2023 Dax Daniels DO          Follow-up Information       Follow up With Specialties Details Why Contact Info    Charles Finney MD Family Medicine Schedule an appointment as soon as possible for a visit on 5/8/2023 To recheck; sooner if worse, not improving, or if any new symptoms. [address]  [address]  [place] [id number]  [phone]               Dax Daniels DO  05/03/23 0088

## 2023-05-03 NOTE — ED TRIAGE NOTES
"C/o abd pain x 1 week with vomiting starting about 1 week ago--'I saw my doctor on Monday and they did some test on me and said if ididn't get better to go to the ER"-reports that she coughs up phlem and then starts vomiting  "

## 2023-05-06 LAB — UA COMPLETE W REFLEX CULTURE PNL UR: ABNORMAL

## 2023-05-06 RX ORDER — SULFAMETHOXAZOLE AND TRIMETHOPRIM 800; 160 MG/1; MG/1
1 TABLET ORAL 2 TIMES DAILY
Qty: 14 TABLET | Refills: 0 | Status: SHIPPED | OUTPATIENT
Start: 2023-05-06 | End: 2023-05-13

## 2023-05-09 NOTE — PROGRESS NOTES
Called Pt and told her of her Culture report and need to change Antibiotics. Told Pt Antibiotic sent in to her pharmacy. Pt tells Nurse she followed up with SIERRA THOMAS at [facility] in [place]. Tells Nurse she is to follow up again on Friday.

## 2023-05-10 NOTE — ADDENDUM NOTE
Encounter addended by: Sang Gee on: 5/10/2023 9:12 AM   Actions taken: SmartForm saved, Flowsheet accepted

## 2023-06-01 ENCOUNTER — OFFICE VISIT (OUTPATIENT)
Dept: GASTROENTEROLOGY | Facility: CLINIC | Age: 78
End: 2023-06-01
Payer: MEDICARE

## 2023-06-01 VITALS
DIASTOLIC BLOOD PRESSURE: 67 MMHG | OXYGEN SATURATION: 99 % | HEART RATE: 79 BPM | SYSTOLIC BLOOD PRESSURE: 139 MMHG | BODY MASS INDEX: 32.98 KG/M2 | WEIGHT: 193.19 LBS | HEIGHT: 64 IN

## 2023-06-01 DIAGNOSIS — K26.9 DUODENAL ULCER: ICD-10-CM

## 2023-06-01 DIAGNOSIS — K25.3 ACUTE GASTRIC ULCER WITHOUT HEMORRHAGE OR PERFORATION: Primary | ICD-10-CM

## 2023-06-01 PROCEDURE — 99214 PR OFFICE/OUTPT VISIT, EST, LEVL IV, 30-39 MIN: ICD-10-PCS | Mod: S$PBB,,,

## 2023-06-01 PROCEDURE — 99214 OFFICE O/P EST MOD 30 MIN: CPT | Mod: S$PBB,,,

## 2023-06-01 PROCEDURE — 99214 OFFICE O/P EST MOD 30 MIN: CPT | Mod: PBBFAC

## 2023-06-01 RX ORDER — SUCRALFATE 1 G/1
TABLET ORAL
COMMUNITY
Start: 2023-05-20 | End: 2023-08-31

## 2023-06-01 RX ORDER — CHOLECALCIFEROL (VITAMIN D3) 125 MCG
5000 CAPSULE ORAL DAILY
COMMUNITY

## 2023-06-01 RX ORDER — METRONIDAZOLE 500 MG/1
1 TABLET ORAL 2 TIMES DAILY
COMMUNITY
Start: 2023-05-19 | End: 2023-08-31

## 2023-06-01 RX ORDER — AMOXICILLIN 250 MG/1
2 CAPSULE ORAL 2 TIMES DAILY
COMMUNITY
Start: 2023-05-19 | End: 2023-08-31

## 2023-06-01 RX ORDER — PANTOPRAZOLE SODIUM 40 MG/1
1 TABLET, DELAYED RELEASE ORAL DAILY
COMMUNITY
Start: 2023-05-19 | End: 2023-08-31 | Stop reason: SDUPTHER

## 2023-06-01 RX ORDER — LANOLIN ALCOHOL/MO/W.PET/CERES
1000 CREAM (GRAM) TOPICAL DAILY
COMMUNITY

## 2023-06-01 RX ORDER — CLARITHROMYCIN 500 MG/1
1 TABLET, FILM COATED ORAL 2 TIMES DAILY
COMMUNITY
Start: 2023-05-19 | End: 2023-08-31

## 2023-06-01 NOTE — PROGRESS NOTES
Tom Baugh is a 77 y.o. female here for GI Problem (Suspected gastrinoma)        PCP: Charles Finney  Referring Provider: No referring provider defined for this encounter.     HPI:  Ms. Baugh is a 76 yo female who presents to clinic today for follow-up after recent hospitalization where she was found to have multiple gastric ulcers. She presented to ED at OSH for diarrhea and generalized weakness/fatigue. Reports she was having multiple episodes of watery diarrhea per day along with abdominal pain, vomiting, decreased appetite and weight loss. She was admitted with UTI - had stool studies and abdominal xray that was unrevealing according to record so they obtained CT abdomen that showed duodenitis and jejunitis and suspected duodenal ulcer. She then underwent EGD on 05/18/23 that revealed multiple gastric and duodenal ulcers. They empirically treated for H pylori with quadruple therapy. Patient completed antibiotic therapy and remains on PPI BID and Carafate QID. She reports at present, she has some intermittent epigastric pain that is resolved with the Carafate. She denies any further vomiting and reports diarrhea is now improving. She is having 1-2 loose stools per day - no watery diarrhea. She had MRI of the abdomen 05/23/23 - will request these results. Denies any use of NSAIDs, smoking, alcohol, or illicit drugs. No prior history of gastric ulcers. This was her first EGD. She is UTD on her colonoscopy.       Colonoscopy 02/17/23 Dr. Montesinos:  - 7 polyps removed with a combination of forceps & cold snare polypectomy (all TA polyps per path report)  - Moderate diverticulosis  - Grade II internal hemorrhoids  - The exam was otherwise normal        ROS:  Review of Systems   Constitutional:  Positive for appetite change and unexpected weight change (20 lbs over 2 weeks). Negative for fatigue and fever.   HENT:  Negative for trouble swallowing.    Respiratory:  Negative for shortness of breath.     Cardiovascular:  Negative for chest pain.   Gastrointestinal:  Positive for abdominal pain, diarrhea (improving) and reflux. Negative for blood in stool, constipation, nausea and vomiting.   Integumentary:  Negative for color change.        PMHX:  has a past medical history of Diverticulosis, Gastric ulcer, Hypertension, Mixed hyperlipidemia, and Urinary tract infection, site not specified.    PSHX:  has a past surgical history that includes skin cancer removal (Right) and Foot surgery (Left).    PFHX: family history includes Breast cancer in an other family member; Glaucoma in her mother.    PSlHX:  reports that she has never smoked. She has never used smokeless tobacco. She reports that she does not drink alcohol and does not use drugs.        Review of patient's allergies indicates:  No Known Allergies    Medication List with Changes/Refills   Current Medications    AMOXICILLIN (AMOXIL) 250 MG CAPSULE    Take 2 capsules by mouth 2 (two) times a day.    CHOLECALCIFEROL, VITAMIN D3, 125 MCG (5,000 UNIT) CAPSULE    Take 5,000 Units by mouth once daily.    CLARITHROMYCIN (BIAXIN) 500 MG TABLET    Take 1 tablet by mouth 2 (two) times a day.    CYANOCOBALAMIN (VITAMIN B-12) 1000 MCG TABLET    Take 1,000 mcg by mouth once daily.    FUROSEMIDE (LASIX) 40 MG TABLET    Take 40 mg by mouth once daily.    LATANOPROST 0.005 % OPHTHALMIC SOLUTION    Place 1 drop into both eyes every evening.    LORATADINE (CLARITIN) 10 MG TABLET    Take 1 tablet by mouth once daily.    LOSARTAN (COZAAR) 25 MG TABLET    Take 1 tablet by mouth once daily.    METRONIDAZOLE (FLAGYL) 500 MG TABLET    Take 1 tablet by mouth 2 (two) times a day.    PANTOPRAZOLE (PROTONIX) 40 MG TABLET    Take 1 tablet by mouth 2 (two) times a day.    POTASSIUM CHLORIDE SA (K-DUR,KLOR-CON) 20 MEQ TABLET    Take 1 tablet by mouth once daily.    SIMVASTATIN (ZOCOR) 80 MG TABLET    Take 1 tablet by mouth every evening.    SUCRALFATE (CARAFATE) 1 GRAM TABLET    Dissolve  "1 tablet in 1 tbsp of water QID and drink   Discontinued Medications    MAGNESIUM OXIDE (MAG-OX) 400 MG (241.3 MG MAGNESIUM) TABLET    Take 1 tablet (400 mg total) by mouth once daily.    ONDANSETRON (ZOFRAN-ODT) 4 MG TBDL    Take 1 tablet (4 mg total) by mouth every 6 (six) hours as needed (nausea/vomiting).        Objective Findings:  Vital Signs:  /67   Pulse 79   Ht 5' 4" (1.626 m)   Wt 87.6 kg (193 lb 3.2 oz)   SpO2 99%   BMI 33.16 kg/m²  Body mass index is 33.16 kg/m².    Physical Exam:  Physical Exam  Vitals reviewed.   Constitutional:       General: She is not in acute distress.     Appearance: Normal appearance.   HENT:      Mouth/Throat:      Mouth: Mucous membranes are moist.   Cardiovascular:      Rate and Rhythm: Normal rate and regular rhythm.      Pulses: Normal pulses.   Pulmonary:      Effort: Pulmonary effort is normal.      Breath sounds: Normal breath sounds.   Abdominal:      General: Bowel sounds are normal. There is no distension.      Palpations: Abdomen is soft.      Tenderness: There is no abdominal tenderness. There is no guarding.   Musculoskeletal:         General: Normal range of motion.   Skin:     General: Skin is warm and dry.   Neurological:      Mental Status: She is alert and oriented to person, place, and time.   Psychiatric:         Mood and Affect: Mood normal.        Labs:  Lab Results   Component Value Date    WBC 10.03 05/03/2023    HGB 13.7 05/03/2023    HCT 43.1 05/03/2023    MCV 89.2 05/03/2023    RDW 13.7 05/03/2023     05/03/2023    LYMPH 11.9 (L) 05/03/2023    LYMPH 1.19 05/03/2023    MONO 5.7 05/03/2023    EOS 0.01 05/03/2023    BASO 0.02 05/03/2023     Lab Results   Component Value Date     06/01/2023    K 4.6 06/01/2023     (H) 06/01/2023    CO2 30 06/01/2023    GLU 98 06/01/2023    BUN 8 06/01/2023    CREATININE 1.13 (H) 06/01/2023    CALCIUM 9.3 06/01/2023    PROT 6.2 (L) 06/01/2023    ALBUMIN 3.2 (L) 06/01/2023    BILITOT 0.2 " 06/01/2023    ALKPHOS 98 06/01/2023    AST 16 06/01/2023    ALT 16 06/01/2023         Imaging: X-Ray Chest PA And Lateral    Result Date: 5/3/2023  EXAMINATION: XR CHEST PA AND LATERAL CLINICAL HISTORY: Cough, unspecified COMPARISON: None TECHNIQUE: Frontal and lateral views of the chest. FINDINGS: Heart size appears within normal limits.  Chronic change of the lungs without focal consolidation, pleural effusion, or pneumothorax.  Visualized osseous and surrounding soft tissue structures demonstrate no acute abnormality.     No acute cardiopulmonary process demonstrated. Point of Service: [facility] Electronically signed by: Damon Gordon Date:    05/03/2023 Time:    18:19    CT Abdomen Pelvis  Without Contrast    Result Date: 5/3/2023  EXAMINATION: CT ABDOMEN PELVIS WITHOUT CONTRAST CLINICAL HISTORY: Bowel obstruction suspected;Abdominal pain, acute, nonlocalized; COMPARISON: None TECHNIQUE: Multiple axial tomographic images of the abdomen and pelvis were obtained without the use of intravenous contrast. FINDINGS: Study considered limited secondary to lack of intravenous contrast.  Mild dependent changes of the lungs noted.  Mitral annular calcifications present. No worrisome focal hepatic abnormality demonstrated on submitted images.  Visualized gallbladder grossly unremarkable.  Visualized pancreas appears unremarkable.  Spleen grossly unremarkable. Bilateral adrenal glands grossly unremarkable.  Question subtle medullary calcinosis bilaterally.  No evidence of hydronephrosis.  Urinary bladder incompletely distended.  Uterine atrophic change present. Colonic diverticulosis.  Scattered fluid within the stomach, small bowel, and large bowel may reflect an element of gastroenteritis/diarrhea causing illness.  There is some wall thickening of small bowel within the left upper quadrant suggested suspicious for enteritis with mild associated mesenteric edema.  No evidence of pneumatosis.  Atherosclerotic  calcification demonstrated.  Scattered skeletal degenerative change.     Scattered fluid within the stomach, small bowel, and large bowel may reflect an element of gastroenteritis/diarrhea causing illness.  There is some wall thickening of small bowel within the left upper quadrant suggested suspicious for enteritis with mild associated mesenteric edema.  No evidence of pneumatosis.  Colonic diverticulosis. Question subtle medullary calcinosis bilaterally. No evidence of hydronephrosis.  Other/detailed findings as above. The CT exam was performed using one or more of the following dose reduction techniques- Automated exposure control, adjustment of the mA and/or kV according to patient size, and/or use of iterative reconstructed technique. Point of Service: [facility] Electronically signed by: Damon Gordon Date:    05/03/2023 Time:    18:30        Assessment:  Tom Baugh is a 77 y.o. female here with:  1. Acute gastric ulcer without hemorrhage or perforation    2. Duodenal ulcer          Recommendations:  1. Continue Protonix 40 mg BID; May continue Carafate as prescribed (short term)  2. Schedule EGD after 8 weeks of therapy  to ensure ulcer healing   3. CMP, Salicylate, and Gastrin level today     Follow up in about 3 months (around 9/1/2023).      Order summary:  Orders Placed This Encounter    Gastrin    Salicylate Level    Comprehensive Metabolic Panel    EGD       Thank you for allowing me to participate in the care of Tom Baugh.      Danielle Mosquera, FNP-BC, MACIEL-ACNP-BC

## 2023-07-31 ENCOUNTER — ANESTHESIA (OUTPATIENT)
Dept: GASTROENTEROLOGY | Facility: HOSPITAL | Age: 78
End: 2023-07-31
Payer: MEDICARE

## 2023-07-31 ENCOUNTER — ANESTHESIA EVENT (OUTPATIENT)
Dept: GASTROENTEROLOGY | Facility: HOSPITAL | Age: 78
End: 2023-07-31
Payer: MEDICARE

## 2023-07-31 ENCOUNTER — HOSPITAL ENCOUNTER (OUTPATIENT)
Dept: GASTROENTEROLOGY | Facility: HOSPITAL | Age: 78
Discharge: HOME | End: 2023-07-31
Payer: MEDICARE

## 2023-07-31 VITALS
DIASTOLIC BLOOD PRESSURE: 62 MMHG | RESPIRATION RATE: 20 BRPM | TEMPERATURE: 98 F | SYSTOLIC BLOOD PRESSURE: 131 MMHG | OXYGEN SATURATION: 100 % | HEART RATE: 76 BPM

## 2023-07-31 DIAGNOSIS — K25.3 ACUTE GASTRIC ULCER WITHOUT HEMORRHAGE OR PERFORATION: ICD-10-CM

## 2023-07-31 PROCEDURE — 88305 SURGICAL PATHOLOGY: ICD-10-PCS | Mod: 26,,, | Performed by: PATHOLOGY

## 2023-07-31 PROCEDURE — 27000284 HC CANNULA NASAL: Performed by: NURSE ANESTHETIST, CERTIFIED REGISTERED

## 2023-07-31 PROCEDURE — 88305 TISSUE EXAM BY PATHOLOGIST: CPT | Mod: TC,SUR | Performed by: INTERNAL MEDICINE

## 2023-07-31 PROCEDURE — 43239 EGD BIOPSY SINGLE/MULTIPLE: CPT | Mod: ,,, | Performed by: INTERNAL MEDICINE

## 2023-07-31 PROCEDURE — 27201423 OPTIME MED/SURG SUP & DEVICES STERILE SUPPLY

## 2023-07-31 PROCEDURE — 88342 IMHCHEM/IMCYTCHM 1ST ANTB: CPT | Mod: 26,,, | Performed by: PATHOLOGY

## 2023-07-31 PROCEDURE — 25000003 PHARM REV CODE 250: Performed by: NURSE ANESTHETIST, CERTIFIED REGISTERED

## 2023-07-31 PROCEDURE — 88305 TISSUE EXAM BY PATHOLOGIST: CPT | Mod: 26,,, | Performed by: PATHOLOGY

## 2023-07-31 PROCEDURE — 63600175 PHARM REV CODE 636 W HCPCS: Performed by: NURSE ANESTHETIST, CERTIFIED REGISTERED

## 2023-07-31 PROCEDURE — 43239 EGD BIOPSY SINGLE/MULTIPLE: CPT | Performed by: INTERNAL MEDICINE

## 2023-07-31 PROCEDURE — 37000008 HC ANESTHESIA 1ST 15 MINUTES

## 2023-07-31 PROCEDURE — D9220A PRA ANESTHESIA: ICD-10-PCS | Mod: ,,, | Performed by: NURSE ANESTHETIST, CERTIFIED REGISTERED

## 2023-07-31 PROCEDURE — 88342 SURGICAL PATHOLOGY: ICD-10-PCS | Mod: 26,,, | Performed by: PATHOLOGY

## 2023-07-31 PROCEDURE — 37000009 HC ANESTHESIA EA ADD 15 MINS

## 2023-07-31 PROCEDURE — D9220A PRA ANESTHESIA: Mod: ,,, | Performed by: NURSE ANESTHETIST, CERTIFIED REGISTERED

## 2023-07-31 PROCEDURE — 43239 PR EGD, FLEX, W/BIOPSY, SGL/MULTI: ICD-10-PCS | Mod: ,,, | Performed by: INTERNAL MEDICINE

## 2023-07-31 RX ORDER — LIDOCAINE HYDROCHLORIDE 20 MG/ML
INJECTION, SOLUTION EPIDURAL; INFILTRATION; INTRACAUDAL; PERINEURAL
Status: DISCONTINUED | OUTPATIENT
Start: 2023-07-31 | End: 2023-07-31

## 2023-07-31 RX ORDER — SODIUM CHLORIDE 0.9 % (FLUSH) 0.9 %
10 SYRINGE (ML) INJECTION
Status: DISCONTINUED | OUTPATIENT
Start: 2023-07-31 | End: 2023-08-01 | Stop reason: HOSPADM

## 2023-07-31 RX ORDER — PROPOFOL 10 MG/ML
VIAL (ML) INTRAVENOUS
Status: DISCONTINUED | OUTPATIENT
Start: 2023-07-31 | End: 2023-07-31

## 2023-07-31 RX ADMIN — PROPOFOL 60 MG: 10 INJECTION, EMULSION INTRAVENOUS at 01:07

## 2023-07-31 RX ADMIN — PROPOFOL 80 MG: 10 INJECTION, EMULSION INTRAVENOUS at 01:07

## 2023-07-31 RX ADMIN — SODIUM CHLORIDE: 9 INJECTION, SOLUTION INTRAVENOUS at 12:07

## 2023-07-31 RX ADMIN — LIDOCAINE HYDROCHLORIDE 60 MG: 20 INJECTION, SOLUTION INTRAVENOUS at 01:07

## 2023-07-31 NOTE — ANESTHESIA PREPROCEDURE EVALUATION
07/31/2023  Tom Baugh is a 78 y.o., female.  Past Medical History:   Diagnosis Date    Diverticulosis     Gastric ulcer     Hypertension     Mixed hyperlipidemia     Urinary tract infection, site not specified        Past Surgical History:   Procedure Laterality Date    FOOT SURGERY Left     area removed from bottom of foot    skin cancer removal Right     foot       Family History   Problem Relation Age of Onset    Glaucoma Mother     Breast cancer Other        Social History     Socioeconomic History    Marital status:    Tobacco Use    Smoking status: Never    Smokeless tobacco: Never   Substance and Sexual Activity    Alcohol use: Never    Drug use: Never       Current Outpatient Medications   Medication Sig Dispense Refill    amoxicillin (AMOXIL) 250 MG capsule Take 2 capsules by mouth 2 (two) times a day.      cholecalciferol, vitamin D3, 125 mcg (5,000 unit) capsule Take 5,000 Units by mouth once daily.      clarithromycin (BIAXIN) 500 MG tablet Take 1 tablet by mouth 2 (two) times a day.      cyanocobalamin (VITAMIN B-12) 1000 MCG tablet Take 1,000 mcg by mouth once daily.      furosemide (LASIX) 40 MG tablet Take 40 mg by mouth once daily.      latanoprost 0.005 % ophthalmic solution Place 1 drop into both eyes every evening.      loratadine (CLARITIN) 10 mg tablet Take 1 tablet by mouth once daily.      losartan (COZAAR) 25 MG tablet Take 1 tablet by mouth once daily.      metroNIDAZOLE (FLAGYL) 500 MG tablet Take 1 tablet by mouth 2 (two) times a day.      pantoprazole (PROTONIX) 40 MG tablet Take 1 tablet by mouth 2 (two) times a day.      potassium chloride SA (K-DUR,KLOR-CON) 20 MEQ tablet Take 1 tablet by mouth once daily.      simvastatin (ZOCOR) 80 MG tablet Take 1 tablet by mouth every evening.      sucralfate (CARAFATE) 1 gram tablet Dissolve 1 tablet  in 1 tbsp of water QID and drink       No current facility-administered medications for this encounter.       Review of patient's allergies indicates:  No Known Allergies  Patient Active Problem List   Diagnosis    Colon cancer screening         Pre-op Assessment    I have reviewed the Patient Summary Reports.     I have reviewed the Nursing Notes. I have reviewed the NPO Status.   I have reviewed the Medications.     Review of Systems  Anesthesia Hx:  No problems with previous Anesthesia    Cardiovascular:   Hypertension    Hepatic/GI:   PUD,        Physical Exam  General: Well nourished, Alert, Oriented and Cooperative    Airway:  Mallampati: II   Mouth Opening: Normal  Neck ROM: Normal ROM    Dental:  Intact    Chest/Lungs:  Normal Respiratory Rate    Heart:  Rate: Normal        Anesthesia Plan  Type of Anesthesia, risks & benefits discussed:    Anesthesia Type: Gen Natural Airway, MAC  Intra-op Monitoring Plan: Standard ASA Monitors  Post Op Pain Control Plan: multimodal analgesia and IV/PO Opioids PRN  Induction:  IV  Informed Consent: Informed consent signed with the Patient and all parties understand the risks and agree with anesthesia plan.  All questions answered. Patient consented to blood products? Yes  ASA Score: 3  Day of Surgery Review of History & Physical: I have interviewed and examined the patient. I have reviewed the patient's H&P dated: There are no significant changes.     Ready For Surgery From Anesthesia Perspective.     .

## 2023-07-31 NOTE — DISCHARGE INSTRUCTIONS
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 Performed random biopsy to rule out H. Pylori given history.  No ulcers, bleeding, or significant gastritis     Recommendation    Await pathology results  Stop Carafate  Decrease protonix to once daily dosing       Outcome of procedure: successful EGD  Disposition: patient to recovery following procedure; discharge to home when appropriate parameters met  Provisions for follow up: please call my office for any unexpected symptoms like chest or abdominal pain or bleeding following your procedure.  Final Diagnosis: reported gastric and duodenal ulcers         NO DRIVING, OPERATING EQUIPMENT, OR SIGNING LEGAL DOCUMENTS FOR 24 HOURS.     THE NURSE WILL CALL YOU WITH YOUR BIOPSY RESULTS IN A FEW DAYS.

## 2023-07-31 NOTE — TRANSFER OF CARE
Anesthesia Transfer of Care Note    Patient: Tom Baugh    Procedure(s) Performed: * EGD with biopsy*    Patient location: GI    Anesthesia Type: general    Transport from OR: Transported from OR on room air with adequate spontaneous ventilation. Continuous ECG monitoring in transport. Continuous SpO2 monitoring in transport    Post pain: adequate analgesia    Post assessment: no apparent anesthetic complications    Post vital signs: stable    Level of consciousness: sedated and responds to stimulation    Nausea/Vomiting: no nausea/vomiting    Complications: none    Transfer of care protocol was followedComments: Good SV continue, NAD, VSS, RTRN      Last vitals:   Visit Vitals  BP (!) 157/84   Pulse 86   Temp 36.7 °C (98 °F)   Resp 16   SpO2 99%   Breastfeeding No

## 2023-08-01 LAB
DHEA SERPL-MCNC: NORMAL
ESTROGEN SERPL-MCNC: NORMAL PG/ML
INSULIN SERPL-ACNC: NORMAL U[IU]/ML
LAB AP GROSS DESCRIPTION: NORMAL
LAB AP LABORATORY NOTES: NORMAL
T3RU NFR SERPL: NORMAL %

## 2023-08-02 ENCOUNTER — TELEPHONE (OUTPATIENT)
Dept: GASTROENTEROLOGY | Facility: HOSPITAL | Age: 78
End: 2023-08-02
Payer: MEDICARE

## 2023-08-02 NOTE — TELEPHONE ENCOUNTER
I called and discussed the pathology results from the patient's recent EGD. No further workup/evaluation indicated. Continue PPI daily indefinitely. All questions were answered.     Quinn Montesinos MD  Gastroenterology

## 2023-08-03 NOTE — ANESTHESIA POSTPROCEDURE EVALUATION
Anesthesia Post Evaluation    Patient: Tom Baugh    Procedure(s) Performed: * EGD *    Final Anesthesia Type: general      Patient location during evaluation: GI PACU  Patient participation: Yes- Able to Participate  Level of consciousness: awake and alert  Post-procedure vital signs: reviewed and stable  Pain management: adequate  Airway patency: patent    PONV status at discharge: No PONV  Anesthetic complications: no      Cardiovascular status: blood pressure returned to baseline and hemodynamically stable  Respiratory status: spontaneous ventilation  Hydration status: euvolemic  Follow-up not needed.  Comments: Pt voices appreciation for care          Vitals Value Taken Time   /68 07/31/23 1347   Temp 36.7 °C (98 °F) 07/31/23 1317   Pulse 71 07/31/23 1347   Resp 18 07/31/23 1347   SpO2 98 % 07/31/23 1347   Vitals shown include unvalidated device data.      Event Time   Out of Recovery 14:01:24         Pain/Steph Score: No data recorded

## 2023-08-11 NOTE — H&P
Gastroenterology Pre-procedure H&P    Chief Complaint: Acute gastric ulcer without hemorrhage or perforation    History of Present Illness    Tom Baugh is a 78 y.o. female that  has a past medical history of Diverticulosis, Gastric ulcer, Hypertension, Mixed hyperlipidemia, and Urinary tract infection, site not specified.     Patient with reported gastric ulcer at OSH a couple of months ago here for EGD for surveillance/assessment for interval healing.       Past Medical History:   Diagnosis Date    Diverticulosis     Gastric ulcer     Hypertension     Mixed hyperlipidemia     Urinary tract infection, site not specified        Past Surgical History:   Procedure Laterality Date    FOOT SURGERY Left     area removed from bottom of foot    skin cancer removal Right     foot       Family History   Problem Relation Age of Onset    Glaucoma Mother     Breast cancer Other        Social History     Socioeconomic History    Marital status:    Tobacco Use    Smoking status: Never    Smokeless tobacco: Never   Substance and Sexual Activity    Alcohol use: Never    Drug use: Never       Current Outpatient Medications   Medication Sig Dispense Refill    amoxicillin (AMOXIL) 250 MG capsule Take 2 capsules by mouth 2 (two) times a day.      cholecalciferol, vitamin D3, 125 mcg (5,000 unit) capsule Take 5,000 Units by mouth once daily.      clarithromycin (BIAXIN) 500 MG tablet Take 1 tablet by mouth 2 (two) times a day.      cyanocobalamin (VITAMIN B-12) 1000 MCG tablet Take 1,000 mcg by mouth once daily.      furosemide (LASIX) 40 MG tablet Take 40 mg by mouth once daily.      latanoprost 0.005 % ophthalmic solution Place 1 drop into both eyes every evening.      loratadine (CLARITIN) 10 mg tablet Take 1 tablet by mouth once daily.      losartan (COZAAR) 25 MG tablet Take 1 tablet by mouth once daily.      metroNIDAZOLE (FLAGYL) 500 MG tablet Take 1 tablet by mouth 2 (two) times a day.      pantoprazole (PROTONIX) 40  MG tablet Take 1 tablet by mouth 2 (two) times a day.      potassium chloride SA (K-DUR,KLOR-CON) 20 MEQ tablet Take 1 tablet by mouth once daily.      simvastatin (ZOCOR) 80 MG tablet Take 1 tablet by mouth every evening.      sucralfate (CARAFATE) 1 gram tablet Dissolve 1 tablet in 1 tbsp of water QID and drink       No current facility-administered medications for this encounter.       Review of patient's allergies indicates:  No Known Allergies    Objective:  Vitals:    07/31/23 1252 07/31/23 1317 07/31/23 1326 07/31/23 1327   BP: (!) 155/76 (!) 157/84 131/62    Pulse: 84 86 79 76   Resp: 19 16 (!) 22 20   Temp:  98 °F (36.7 °C)     SpO2: 98% 99% 100% 100%        GEN: normal appearing, NAD, AAO x3  HENT: NCAT, anicteric, OP benign  CV: normal rate, regular rhythm  RESP: CTA, symmetric rise, unlabored  ABD: soft, ND, no guarding or TTP  SKIN: warm and dry  NEURO: grossly afocal    Assessment and Plan:    Proceed with:    EGD for follow up of gastric ulcer       Quinn Montesinos MD  Gastroenterology

## 2023-08-31 ENCOUNTER — OFFICE VISIT (OUTPATIENT)
Dept: GASTROENTEROLOGY | Facility: CLINIC | Age: 78
End: 2023-08-31
Payer: MEDICARE

## 2023-08-31 VITALS
OXYGEN SATURATION: 99 % | BODY MASS INDEX: 34.83 KG/M2 | HEIGHT: 64 IN | WEIGHT: 204 LBS | HEART RATE: 90 BPM | SYSTOLIC BLOOD PRESSURE: 144 MMHG | DIASTOLIC BLOOD PRESSURE: 63 MMHG

## 2023-08-31 DIAGNOSIS — K21.9 GASTROESOPHAGEAL REFLUX DISEASE WITHOUT ESOPHAGITIS: Primary | ICD-10-CM

## 2023-08-31 DIAGNOSIS — Z87.11 HISTORY OF GASTRIC ULCER: ICD-10-CM

## 2023-08-31 PROCEDURE — 99214 OFFICE O/P EST MOD 30 MIN: CPT | Mod: PBBFAC

## 2023-08-31 PROCEDURE — 99213 OFFICE O/P EST LOW 20 MIN: CPT | Mod: S$PBB,,,

## 2023-08-31 PROCEDURE — 99213 PR OFFICE/OUTPT VISIT, EST, LEVL III, 20-29 MIN: ICD-10-PCS | Mod: S$PBB,,,

## 2023-08-31 RX ORDER — PANTOPRAZOLE SODIUM 40 MG/1
40 TABLET, DELAYED RELEASE ORAL DAILY
Qty: 90 TABLET | Refills: 3 | Status: SHIPPED | OUTPATIENT
Start: 2023-08-31 | End: 2023-09-28

## 2023-08-31 RX ORDER — DICLOFENAC SODIUM 10 MG/G
GEL TOPICAL
COMMUNITY
Start: 2023-08-23

## 2023-08-31 NOTE — PROGRESS NOTES
Tom Baugh is a 78 y.o. female here for Follow-up (3 month)        PCP: Charles Finney  Referring Provider: No referring provider defined for this encounter.     HPI:  Ms. Baugh is a 76 yo female who presents to clinic today for follow-up after EGD with Dr. Montesinos. She was previously seen after hospitalization where she was found to have multiple gastric ulcers. Following her EGD, she stopped the Carafate and is taking the Protonix 40 mg once daily. She denies any complaints or problems at present. Her epigastric pain has improved. She is able to eat and tolerate her meals without any nausea or vomiting. Denies any use of NSAIDs, smoking, alcohol, or illicit drugs. She is UTD on her colonoscopy.     EGD w/ Dr. 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 Performed random biopsy to rule out H. Pylori given history.  - No ulcers, bleeding, or significant gastritis     Colonoscopy 02/17/23 Dr. Montesinos:  - 7 polyps removed with a combination of forceps & cold snare polypectomy (all TA polyps per path report)  - Moderate diverticulosis  - Grade II internal hemorrhoids  - The exam was otherwise normal    Follow-up  Pertinent negatives include no abdominal pain, chest pain, fatigue, fever, nausea or vomiting.         ROS:  Review of Systems   Constitutional:  Negative for appetite change, fatigue, fever and unexpected weight change.   HENT:  Negative for trouble swallowing.    Respiratory:  Negative for shortness of breath.    Cardiovascular:  Negative for chest pain.   Gastrointestinal:  Negative for abdominal pain, blood in stool, constipation, diarrhea, nausea, vomiting and reflux.   Integumentary:   Negative for color change.          PMHX:  has a past medical history of Arthritis of left knee, Diverticulosis, Gastric ulcer, Hypertension, Mixed hyperlipidemia, and Urinary tract infection, site not specified.    PSHX:  has a past surgical history that includes skin cancer removal (Right) and Foot surgery (Left).    PFHX: family history includes Breast cancer in an other family member; Glaucoma in her mother.    PSlHX:  reports that she has never smoked. She has never used smokeless tobacco. She reports that she does not drink alcohol and does not use drugs.        Review of patient's allergies indicates:  No Known Allergies    Medication List with Changes/Refills   Current Medications    CHOLECALCIFEROL, VITAMIN D3, 125 MCG (5,000 UNIT) CAPSULE    Take 5,000 Units by mouth once daily.    CYANOCOBALAMIN (VITAMIN B-12) 1000 MCG TABLET    Take 1,000 mcg by mouth once daily.    DICLOFENAC SODIUM (VOLTAREN) 1 % GEL        FUROSEMIDE (LASIX) 40 MG TABLET    Take 40 mg by mouth once daily.    LATANOPROST 0.005 % OPHTHALMIC SOLUTION    Place 1 drop into both eyes every evening.    LORATADINE (CLARITIN) 10 MG TABLET    Take 1 tablet by mouth once daily.    LOSARTAN (COZAAR) 25 MG TABLET    Take 1 tablet by mouth once daily.    POTASSIUM CHLORIDE SA (K-DUR,KLOR-CON) 20 MEQ TABLET    Take 1 tablet by mouth once daily.    SIMVASTATIN (ZOCOR) 80 MG TABLET    Take 1 tablet by mouth every evening.   Changed and/or Refilled Medications    Modified Medication Previous Medication    PANTOPRAZOLE (PROTONIX) 40 MG TABLET pantoprazole (PROTONIX) 40 MG tablet       Take 1 tablet (40 mg total) by mouth once daily.    Take 1 tablet by mouth once daily.   Discontinued Medications    AMOXICILLIN (AMOXIL) 250 MG CAPSULE    Take 2 capsules by mouth 2 (two) times a day.    CLARITHROMYCIN (BIAXIN) 500 MG TABLET    Take 1 tablet by mouth 2 (two) times a day.    METRONIDAZOLE (FLAGYL) 500 MG TABLET    Take 1 tablet by mouth 2 (two) times a  "day.    SUCRALFATE (CARAFATE) 1 GRAM TABLET    Dissolve 1 tablet in 1 tbsp of water QID and drink        Objective Findings:  Vital Signs:  BP (!) 144/63   Pulse 90   Ht 5' 4" (1.626 m)   Wt 92.5 kg (204 lb)   SpO2 99%   BMI 35.02 kg/m²  Body mass index is 35.02 kg/m².    Physical Exam:  Physical Exam  Vitals reviewed.   Constitutional:       General: She is not in acute distress.     Appearance: Normal appearance.   HENT:      Mouth/Throat:      Mouth: Mucous membranes are moist.   Cardiovascular:      Rate and Rhythm: Normal rate and regular rhythm.      Pulses: Normal pulses.   Pulmonary:      Effort: Pulmonary effort is normal.      Breath sounds: Normal breath sounds.   Abdominal:      General: Bowel sounds are normal. There is no distension.      Palpations: Abdomen is soft.      Tenderness: There is no abdominal tenderness. There is no guarding.   Musculoskeletal:         General: Normal range of motion.   Skin:     General: Skin is warm and dry.   Neurological:      Mental Status: She is alert and oriented to person, place, and time.   Psychiatric:         Mood and Affect: Mood normal.          Labs:  Lab Results   Component Value Date    WBC 10.03 05/03/2023    HGB 13.7 05/03/2023    HCT 43.1 05/03/2023    MCV 89.2 05/03/2023    RDW 13.7 05/03/2023     05/03/2023    LYMPH 11.9 (L) 05/03/2023    LYMPH 1.19 05/03/2023    MONO 5.7 05/03/2023    EOS 0.01 05/03/2023    BASO 0.02 05/03/2023     Lab Results   Component Value Date     06/01/2023    K 4.6 06/01/2023     (H) 06/01/2023    CO2 30 06/01/2023    GLU 98 06/01/2023    BUN 8 06/01/2023    CREATININE 1.13 (H) 06/01/2023    CALCIUM 9.3 06/01/2023    PROT 6.2 (L) 06/01/2023    ALBUMIN 3.2 (L) 06/01/2023    BILITOT 0.2 06/01/2023    ALKPHOS 98 06/01/2023    AST 16 06/01/2023    ALT 16 06/01/2023         Imaging: X-Ray Chest PA And Lateral    Result Date: 5/3/2023  EXAMINATION: XR CHEST PA AND LATERAL CLINICAL HISTORY: Cough, unspecified " COMPARISON: None TECHNIQUE: Frontal and lateral views of the chest. FINDINGS: Heart size appears within normal limits.  Chronic change of the lungs without focal consolidation, pleural effusion, or pneumothorax.  Visualized osseous and surrounding soft tissue structures demonstrate no acute abnormality.     No acute cardiopulmonary process demonstrated. Point of Service: [facility] Electronically signed by: Damon Gordon Date:    05/03/2023 Time:    18:19    CT Abdomen Pelvis  Without Contrast    Result Date: 5/3/2023  EXAMINATION: CT ABDOMEN PELVIS WITHOUT CONTRAST CLINICAL HISTORY: Bowel obstruction suspected;Abdominal pain, acute, nonlocalized; COMPARISON: None TECHNIQUE: Multiple axial tomographic images of the abdomen and pelvis were obtained without the use of intravenous contrast. FINDINGS: Study considered limited secondary to lack of intravenous contrast.  Mild dependent changes of the lungs noted.  Mitral annular calcifications present. No worrisome focal hepatic abnormality demonstrated on submitted images.  Visualized gallbladder grossly unremarkable.  Visualized pancreas appears unremarkable.  Spleen grossly unremarkable. Bilateral adrenal glands grossly unremarkable.  Question subtle medullary calcinosis bilaterally.  No evidence of hydronephrosis.  Urinary bladder incompletely distended.  Uterine atrophic change present. Colonic diverticulosis.  Scattered fluid within the stomach, small bowel, and large bowel may reflect an element of gastroenteritis/diarrhea causing illness.  There is some wall thickening of small bowel within the left upper quadrant suggested suspicious for enteritis with mild associated mesenteric edema.  No evidence of pneumatosis.  Atherosclerotic calcification demonstrated.  Scattered skeletal degenerative change.     Scattered fluid within the stomach, small bowel, and large bowel may reflect an element of gastroenteritis/diarrhea causing illness.  There is some  wall thickening of small bowel within the left upper quadrant suggested suspicious for enteritis with mild associated mesenteric edema.  No evidence of pneumatosis.  Colonic diverticulosis. Question subtle medullary calcinosis bilaterally. No evidence of hydronephrosis.  Other/detailed findings as above. The CT exam was performed using one or more of the following dose reduction techniques- Automated exposure control, adjustment of the mA and/or kV according to patient size, and/or use of iterative reconstructed technique. Point of Service: [facility] Electronically signed by: Damon Gordon Date:    05/03/2023 Time:    18:30        Assessment:  Tom Baugh is a 78 y.o. female here with:  1. Gastroesophageal reflux disease without esophagitis    2. History of gastric ulcer            Recommendations:  1. Continue Protonix 40 mg once daily   2. Avoid NSAIDs  3. F/u in clinic in one year - sooner PRN    Follow up in about 1 year (around 8/31/2024).      Order summary:  Orders Placed This Encounter    pantoprazole (PROTONIX) 40 MG tablet         Thank you for allowing me to participate in the care of Tom Baugh.      Danielle Moqsuera, FNP-BC, AG-ACNP-BC

## 2023-09-28 DIAGNOSIS — K21.9 GASTROESOPHAGEAL REFLUX DISEASE WITHOUT ESOPHAGITIS: ICD-10-CM

## 2023-09-28 RX ORDER — PANTOPRAZOLE SODIUM 40 MG/1
40 TABLET, DELAYED RELEASE ORAL DAILY
Qty: 90 TABLET | Refills: 3 | Status: SHIPPED | OUTPATIENT
Start: 2023-09-28

## 2023-11-13 PROBLEM — K25.3 ACUTE GASTRIC ULCER WITHOUT HEMORRHAGE OR PERFORATION: Status: RESOLVED | Noted: 2023-07-31 | Resolved: 2023-11-13

## 2024-08-26 ENCOUNTER — OFFICE VISIT (OUTPATIENT)
Dept: GASTROENTEROLOGY | Facility: CLINIC | Age: 79
End: 2024-08-26
Payer: MEDICARE

## 2024-08-26 VITALS
HEIGHT: 64 IN | DIASTOLIC BLOOD PRESSURE: 85 MMHG | HEART RATE: 71 BPM | SYSTOLIC BLOOD PRESSURE: 176 MMHG | WEIGHT: 224 LBS | BODY MASS INDEX: 38.24 KG/M2

## 2024-08-26 DIAGNOSIS — K21.9 GASTROESOPHAGEAL REFLUX DISEASE WITHOUT ESOPHAGITIS: ICD-10-CM

## 2024-08-26 PROCEDURE — 99214 OFFICE O/P EST MOD 30 MIN: CPT | Mod: S$PBB,,,

## 2024-08-26 PROCEDURE — 99999 PR PBB SHADOW E&M-EST. PATIENT-LVL III: CPT | Mod: PBBFAC,,,

## 2024-08-26 PROCEDURE — 99213 OFFICE O/P EST LOW 20 MIN: CPT | Mod: PBBFAC

## 2024-08-26 RX ORDER — PANTOPRAZOLE SODIUM 40 MG/1
40 TABLET, DELAYED RELEASE ORAL DAILY
Qty: 90 TABLET | Refills: 3 | Status: SHIPPED | OUTPATIENT
Start: 2024-08-26

## 2024-08-26 NOTE — PROGRESS NOTES
Gastroenterology Clinic Note    Patient ID: [id number]   Referring MD: No ref. provider found   Chief Complaint:   Chief Complaint   Patient presents with    Follow-up     No issues     Medication Refill     Pantoprazole       History of Present Illness   Tom Baugh is an 79 y.o. AAF who is referred for follow-up after recent hospitalization where she was found to have multiple gastric ulcers. She presented to ED at [facility] for diarrhea and generalized weakness/fatigue. Reports she was having multiple episodes of watery diarrhea per day along with abdominal pain, vomiting, decreased appetite and weight loss. She was admitted with UTI - had stool studies and abdominal xray that was unrevealing according to record so they obtained CT abdomen that showed duodenitis and jejunitis and suspected duodenal ulcer. She then underwent EGD on 05/18/23 that revealed multiple gastric and duodenal ulcers. They empirically treated for H pylori with quadruple therapy. Patient completed antibiotic therapy and remains on PPI BID and Carafate QID. She reports at present, she has some intermittent epigastric pain that is resolved with the Carafate. She denies any further vomiting and reports diarrhea is now improving. She is having 1-2 loose stools per day - no watery diarrhea. She had MRI of the abdomen 05/23/23 - will request these results. Denies any use of NSAIDs, smoking, alcohol, or illicit drugs. No prior history of gastric ulcers. This was her first EGD. She is UTD on her colonoscopy.      Previous workup:  EGD; CT abdomen pelvis without contrast    Last colonoscopy was 02/17/2023 with 3 year recall for screening purposes.    Interval  - she is doing well overall at follow-up  - she has had 1 isolated episode of abdominal pain, nausea, vomiting, and diarrhea since our previous visit  - states she ate at a restaurant and symptoms started on the way home; she took Carafate q.i.d. x 1 day; she has had no recurrence of symptoms  following; states the symptoms were similar to previous when she had the gastric ulcer  - no complaints of pain, nausea, or diarrhea on exam today    Review of Systems   Constitutional:  Negative for weight loss.   Gastrointestinal:  Positive for heartburn. Negative for abdominal pain, blood in stool, constipation, diarrhea, melena, nausea and vomiting.       Past Medical History      Past Medical History:   Diagnosis Date    Arthritis of left knee     Diverticulosis     Gastric ulcer     Hypertension     Mixed hyperlipidemia     Urinary tract infection, site not specified        Past Surgical History     Past Surgical History:   Procedure Laterality Date    FOOT SURGERY Left     area removed from bottom of foot    skin cancer removal Right     foot       Allergies   Review of patient's allergies indicates:  No Known Allergies    Immunization History     There is no immunization history on file for this patient.    Past Family History      Family History   Problem Relation Name Age of Onset    Glaucoma Mother      Breast cancer Other niece        Past Social History      Social History     Socioeconomic History    Marital status:    Tobacco Use    Smoking status: Never    Smokeless tobacco: Never   Substance and Sexual Activity    Alcohol use: Never    Drug use: Never     Social Determinants of Health     Financial Resource Strain: Not on File (6/11/2022)    Received from [facility]    Financial Resource Strain     Financial Resource Strain: 0   Food Insecurity: Not on File (6/11/2022)    Received from [facility]    Food Insecurity     Food: 0   Transportation Needs: Not on File (6/11/2022)    Received from [facility]    Transportation Needs     Transportation: 0   Physical Activity: Not on File (6/11/2022)    Received from [facility]    Physical Activity     Physical Activity: 0   Stress: Not on File (6/11/2022)    Received from [facility]    Stress     Stress: 0   Housing Stability: Not on File  "(2022)    Received from [facility]    [facility] Stability     Housin       Current Medications     Outpatient Medications Marked as Taking for the 24 encounter (Office Visit) with Danielle Mosquera FNP   Medication Sig Dispense Refill    cholecalciferol, vitamin D3, 125 mcg (5,000 unit) capsule Take 5,000 Units by mouth once daily.      cyanocobalamin (VITAMIN B-12) 1000 MCG tablet Take 1,000 mcg by mouth once daily.      diclofenac sodium (VOLTAREN) 1 % Gel       furosemide (LASIX) 40 MG tablet Take 40 mg by mouth once daily.      latanoprost 0.005 % ophthalmic solution Place 1 drop into both eyes every evening.      loratadine (CLARITIN) 10 mg tablet Take 1 tablet by mouth once daily.      losartan (COZAAR) 25 MG tablet Take 1 tablet by mouth once daily.      potassium chloride SA (K-DUR,KLOR-CON) 20 MEQ tablet Take 1 tablet by mouth once daily.      simvastatin (ZOCOR) 80 MG tablet Take 1 tablet by mouth every evening.      [DISCONTINUED] pantoprazole (PROTONIX) 40 MG tablet Take 1 tablet (40 mg total) by mouth once daily. 90 tablet 3        I have reviewed the current medications, allergies, vital signs, past medical and surgical history, family medical history, and social history for this encounter and agree with all findings.    OBJECTIVE    Physical Exam    BP (!) 176/85   Pulse 71   Ht 5' 4" (1.626 m)   Wt 101.6 kg (224 lb)   BMI 38.45 kg/m²   GEN: Well appearing, cooperative, NAD  NECK: Supple, no LAD  CV: Normal rate  RESP: Unlabored  ABD: ND, no guarding  EXT: No clubbing, cyanosis, or edema  SKIN: Warm and dry  NEURO: AAO x4.     LABS    CBC (with or without Differential):   Lab Results   Component Value Date    WBC 10.03 2023    HGB 13.7 2023    HCT 43.1 2023    MCV 89.2 2023    MCH 28.4 2023    MCHC 31.8 (L) 2023    RDW 13.7 2023     2023    MPV 11.5 2023    NEUTOPHILPCT 81.9 (H) 2023    DIFFTYPE Auto 2023 "     BMP/CMP:   Lab Results   Component Value Date     06/01/2023    K 4.6 06/01/2023     (H) 06/01/2023    CO2 30 06/01/2023    BUN 8 06/01/2023    CREATININE 1.13 (H) 06/01/2023    GLU 98 06/01/2023    CALCIUM 9.3 06/01/2023    ALBUMIN 3.2 (L) 06/01/2023    AST 16 06/01/2023    ALT 16 06/01/2023    ALKPHOS 98 06/01/2023    MG 1.2 (L) 05/03/2023        IMAGING  CT abdomen pelvis without contrast 05/2023  - Scattered fluid within the stomach, small bowel, and large bowel may reflect an element of gastroenteritis/diarrhea causing illness. There is some wall thickening of small bowel within the left upper quadrant suggested suspicious for enteritis with mild associated mesenteric edema. No evidence of pneumatosis. Colonic diverticulosis. Question subtle medullary calcinosis bilaterally. No evidence of hydronephrosis. Other/detailed findings as above.     ASSESSMENT  Tom Baugh is a 79 y.o. AAF with history of hypertension, GERD, gastric ulcer, and diverticulosis who is referred to clinic for follow-up.    1. Gastroesophageal reflux disease without esophagitis           PLAN    - H. pylori breath test 1 week after stopping PPI therapy; resume PPI therapy once test is complete  - return to GI clinic for follow-up 1 year, sooner as needed  There are no Patient Instructions on file for this visit.      Orders Placed This Encounter   Procedures    H. pylori Breath Test     Standing Status:   Future     Standing Expiration Date:   10/25/2025         The risks and benefits of my recommendations, as well as other treatment options were discussed with the patient today. All questions were answered.    35 minutes of total time spent on the encounter, which includes face to face time and non-face to face time preparing to see the patient (eg, review of tests), obtaining and/or reviewing separately obtained history, documenting clinical information in the electronic or other health record, Independently  interpreting results (not separately reported) and communicating results to the patient/family/caregiver, or care coordination (not separately reported).        UNIQUE CarbajalP/ACNP  [facility]

## 2024-09-03 ENCOUNTER — LAB VISIT (OUTPATIENT)
Dept: LAB | Facility: HOSPITAL | Age: 79
End: 2024-09-03
Payer: MEDICARE

## 2024-09-03 DIAGNOSIS — K21.9 GASTROESOPHAGEAL REFLUX DISEASE WITHOUT ESOPHAGITIS: ICD-10-CM

## 2024-09-03 LAB — UREA BREATH TEST QL: NEGATIVE

## 2024-09-03 PROCEDURE — 83014 H PYLORI DRUG ADMIN: CPT

## 2024-09-03 PROCEDURE — 83013 H PYLORI (C-13) BREATH: CPT

## 2024-09-04 ENCOUNTER — TELEPHONE (OUTPATIENT)
Dept: GASTROENTEROLOGY | Facility: CLINIC | Age: 79
End: 2024-09-04
Payer: MEDICARE

## 2024-10-02 DIAGNOSIS — G62.9 POLYNEUROPATHY: Primary | ICD-10-CM

## 2024-10-07 ENCOUNTER — INITIAL CONSULT (OUTPATIENT)
Dept: VASCULAR SURGERY | Facility: CLINIC | Age: 79
End: 2024-10-07
Payer: MEDICARE

## 2024-10-07 VITALS — HEIGHT: 64 IN | BODY MASS INDEX: 38.24 KG/M2 | WEIGHT: 224 LBS

## 2024-10-07 DIAGNOSIS — G62.9 POLYNEUROPATHY: Primary | ICD-10-CM

## 2024-10-07 DIAGNOSIS — R09.89 BRUIT: ICD-10-CM

## 2024-10-07 PROCEDURE — 99204 OFFICE O/P NEW MOD 45 MIN: CPT | Mod: S$PBB,,, | Performed by: NURSE PRACTITIONER

## 2024-10-07 PROCEDURE — 99214 OFFICE O/P EST MOD 30 MIN: CPT | Mod: PBBFAC | Performed by: NURSE PRACTITIONER

## 2024-10-07 PROCEDURE — 99999 PR PBB SHADOW E&M-EST. PATIENT-LVL IV: CPT | Mod: PBBFAC,,, | Performed by: NURSE PRACTITIONER

## 2024-10-07 RX ORDER — LEVOCETIRIZINE DIHYDROCHLORIDE 5 MG/1
5 TABLET, FILM COATED ORAL DAILY PRN
COMMUNITY
Start: 2024-09-30

## 2024-10-07 NOTE — PROGRESS NOTES
"Subjective:       Patient ID: Tom Baugh is a 79 y.o. female.    Chief Complaint: Leg Pain  10/07/2024  new patient with history of hypertension chronic kidney disease chronic bilateral lower extremity edema, denies heart failure or cardiac disease no history of stroke or TIA has never had carotid Doppler    family history includes Breast cancer in an other family member; Glaucoma in her mother.  Past Medical History:   Diagnosis Date    Arthritis of left knee     Diverticulosis     Gastric ulcer     Hypertension     Mixed hyperlipidemia     Urinary tract infection, site not specified       Past Surgical History:   Procedure Laterality Date    FOOT SURGERY Left     area removed from bottom of foot    skin cancer removal Right     foot       reports that she has never smoked. She has never used smokeless tobacco. She reports that she does not drink alcohol and does not use drugs.   Leg Pain       Review of Systems   Respiratory:  Negative for chest tightness and shortness of breath.    Cardiovascular:  Positive for leg swelling.   Musculoskeletal:  Positive for leg pain.   Integumentary:  Negative for wound.         Objective:      Ht 5' 4" (1.626 m)   Wt 101.6 kg (223 lb 15.8 oz)   BMI 38.45 kg/m²    Physical Exam  Vitals and nursing note reviewed.   Constitutional:       Appearance: Normal appearance.   HENT:      Head: Normocephalic.      Mouth/Throat:      Mouth: Mucous membranes are moist.   Eyes:      Conjunctiva/sclera: Conjunctivae normal.   Cardiovascular:      Rate and Rhythm: Normal rate and regular rhythm.   Pulmonary:      Effort: Pulmonary effort is normal.   Abdominal:      Palpations: Abdomen is soft.   Musculoskeletal:      Right lower leg: Edema present.      Left lower leg: Edema present.      Comments: 1+edema   Skin:     General: Skin is warm and dry.   Neurological:      Mental Status: She is alert and oriented to person, place, and time.   Psychiatric:         Mood and Affect: Mood " normal.           Assessment:       Bilateral lower extremity edema with palpable DP pulses      Plan:       Compression socks elevate bilateral lower extremity edema  We will get arterial duplex with exercise evaluate for PVD and carotid duplex

## 2024-11-21 ENCOUNTER — TELEPHONE (OUTPATIENT)
Dept: SURGERY | Facility: HOSPITAL | Age: 79
End: 2024-11-21
Payer: MEDICARE

## 2024-11-21 NOTE — TELEPHONE ENCOUNTER
Vascular surgery    Notify patient of arterial duplex SP at rest and with exercise are normal    Notify patient of carotid duplex without significant carotid disease    Continue current medication    No surgery recommended    Call or follow-up p.r.n.